# Patient Record
Sex: MALE | Race: WHITE | NOT HISPANIC OR LATINO | URBAN - METROPOLITAN AREA
[De-identification: names, ages, dates, MRNs, and addresses within clinical notes are randomized per-mention and may not be internally consistent; named-entity substitution may affect disease eponyms.]

---

## 2017-05-04 ENCOUNTER — EMERGENCY (EMERGENCY)
Facility: HOSPITAL | Age: 82
LOS: 0 days | Discharge: HOME | End: 2017-05-04

## 2017-06-28 DIAGNOSIS — Z23 ENCOUNTER FOR IMMUNIZATION: ICD-10-CM

## 2017-06-28 DIAGNOSIS — Z88.0 ALLERGY STATUS TO PENICILLIN: ICD-10-CM
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
## 2017-06-28 DIAGNOSIS — S09.90XA UNSPECIFIED INJURY OF HEAD, INITIAL ENCOUNTER: ICD-10-CM
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
## 2017-06-28 DIAGNOSIS — Z79.82 LONG TERM (CURRENT) USE OF ASPIRIN: ICD-10-CM

## 2017-06-28 DIAGNOSIS — I10 ESSENTIAL (PRIMARY) HYPERTENSION: ICD-10-CM

## 2017-06-28 DIAGNOSIS — S80.212A ABRASION, LEFT KNEE, INITIAL ENCOUNTER: ICD-10-CM

## 2018-02-23 ENCOUNTER — INPATIENT (INPATIENT)
Facility: HOSPITAL | Age: 83
LOS: 5 days | Discharge: NURSING, REHAB OR LONG-TERM CARE | End: 2018-03-01
Attending: INTERNAL MEDICINE

## 2018-02-23 VITALS
HEART RATE: 75 BPM | TEMPERATURE: 101 F | SYSTOLIC BLOOD PRESSURE: 172 MMHG | OXYGEN SATURATION: 97 % | DIASTOLIC BLOOD PRESSURE: 79 MMHG | RESPIRATION RATE: 14 BRPM

## 2018-02-23 DIAGNOSIS — G93.41 METABOLIC ENCEPHALOPATHY: ICD-10-CM

## 2018-02-23 DIAGNOSIS — G93.6 CEREBRAL EDEMA: ICD-10-CM

## 2018-02-23 LAB
ALBUMIN SERPL ELPH-MCNC: 4.2 G/DL — SIGNIFICANT CHANGE UP (ref 3–5.5)
ALP SERPL-CCNC: 64 U/L — SIGNIFICANT CHANGE UP (ref 30–115)
ALT FLD-CCNC: 15 U/L — SIGNIFICANT CHANGE UP (ref 0–41)
ANION GAP SERPL CALC-SCNC: 7 MMOL/L — SIGNIFICANT CHANGE UP (ref 7–14)
APPEARANCE UR: CLEAR — SIGNIFICANT CHANGE UP
AST SERPL-CCNC: 19 U/L — SIGNIFICANT CHANGE UP (ref 0–41)
B-TYPE NATRIURETIC PEPTIDE BNP RESULT: 79 PG/ML — SIGNIFICANT CHANGE UP (ref 0–99)
BASE EXCESS BLDV CALC-SCNC: 3.8 MMOL/L — HIGH (ref -2–2)
BASOPHILS # BLD AUTO: 0.03 K/UL — SIGNIFICANT CHANGE UP (ref 0–0.2)
BASOPHILS NFR BLD AUTO: 0.5 % — SIGNIFICANT CHANGE UP (ref 0–1)
BILIRUB SERPL-MCNC: 0.4 MG/DL — SIGNIFICANT CHANGE UP (ref 0.2–1.2)
BILIRUB UR-MCNC: NEGATIVE — SIGNIFICANT CHANGE UP
BUN SERPL-MCNC: 15 MG/DL — SIGNIFICANT CHANGE UP (ref 10–20)
CALCIUM SERPL-MCNC: 9.2 MG/DL — SIGNIFICANT CHANGE UP (ref 8.5–10.1)
CHLORIDE SERPL-SCNC: 105 MMOL/L — SIGNIFICANT CHANGE UP (ref 98–110)
CK MB BLD-MCNC: 0 % — SIGNIFICANT CHANGE UP (ref 0–4)
CK MB CFR SERPL CALC: 1.2 NG/ML — SIGNIFICANT CHANGE UP (ref 0.6–6.3)
CK SERPL-CCNC: 293 U/L — HIGH (ref 0–225)
CO2 SERPL-SCNC: 30 MMOL/L — SIGNIFICANT CHANGE UP (ref 17–32)
COLOR SPEC: YELLOW — SIGNIFICANT CHANGE UP
CREAT SERPL-MCNC: 0.8 MG/DL — SIGNIFICANT CHANGE UP (ref 0.7–1.5)
DIFF PNL FLD: (no result)
EOSINOPHIL # BLD AUTO: 0.1 K/UL — SIGNIFICANT CHANGE UP (ref 0–0.7)
EOSINOPHIL NFR BLD AUTO: 1.5 % — SIGNIFICANT CHANGE UP (ref 0–8)
GLUCOSE SERPL-MCNC: 112 MG/DL — HIGH (ref 70–110)
GLUCOSE UR QL: NEGATIVE MG/DL — SIGNIFICANT CHANGE UP
HCO3 BLDV-SCNC: 29 MMOL/L — SIGNIFICANT CHANGE UP (ref 22–29)
HCT VFR BLD CALC: 35.9 % — LOW (ref 42–52)
HGB BLD-MCNC: 12.2 G/DL — LOW (ref 14–18)
IMM GRANULOCYTES NFR BLD AUTO: 0.5 % — HIGH (ref 0.1–0.3)
KETONES UR-MCNC: NEGATIVE — SIGNIFICANT CHANGE UP
LEUKOCYTE ESTERASE UR-ACNC: NEGATIVE — SIGNIFICANT CHANGE UP
LYMPHOCYTES # BLD AUTO: 0.8 K/UL — LOW (ref 1.2–3.4)
LYMPHOCYTES # BLD AUTO: 12.2 % — LOW (ref 20.5–51.1)
MCHC RBC-ENTMCNC: 31.2 PG — HIGH (ref 27–31)
MCHC RBC-ENTMCNC: 34 G/DL — SIGNIFICANT CHANGE UP (ref 32–37)
MCV RBC AUTO: 91.8 FL — SIGNIFICANT CHANGE UP (ref 80–94)
MONOCYTES # BLD AUTO: 1.02 K/UL — HIGH (ref 0.1–0.6)
MONOCYTES NFR BLD AUTO: 15.6 % — HIGH (ref 1.7–9.3)
NEUTROPHILS # BLD AUTO: 4.57 K/UL — SIGNIFICANT CHANGE UP (ref 1.4–6.5)
NEUTROPHILS NFR BLD AUTO: 69.7 % — SIGNIFICANT CHANGE UP (ref 42.2–75.2)
NITRITE UR-MCNC: NEGATIVE — SIGNIFICANT CHANGE UP
PCO2 BLDV: 45 MMHG — SIGNIFICANT CHANGE UP (ref 41–51)
PH BLDV: 7.41 — SIGNIFICANT CHANGE UP (ref 7.26–7.43)
PH UR: 5.5 — SIGNIFICANT CHANGE UP (ref 5–8)
PLATELET # BLD AUTO: 144 K/UL — SIGNIFICANT CHANGE UP (ref 130–400)
PO2 BLDV: 34 MMHG — SIGNIFICANT CHANGE UP (ref 20–40)
POTASSIUM SERPL-MCNC: 3.9 MMOL/L — SIGNIFICANT CHANGE UP (ref 3.5–5)
POTASSIUM SERPL-SCNC: 3.9 MMOL/L — SIGNIFICANT CHANGE UP (ref 3.5–5)
PROT SERPL-MCNC: 6.9 G/DL — SIGNIFICANT CHANGE UP (ref 6–8)
PROT UR-MCNC: NEGATIVE MG/DL — SIGNIFICANT CHANGE UP
RBC # BLD: 3.91 M/UL — LOW (ref 4.7–6.1)
RBC # FLD: 12.6 % — SIGNIFICANT CHANGE UP (ref 11.5–14.5)
SAO2 % BLDV: 66 % — SIGNIFICANT CHANGE UP
SODIUM SERPL-SCNC: 142 MMOL/L — SIGNIFICANT CHANGE UP (ref 135–146)
SP GR SPEC: 1.02 — SIGNIFICANT CHANGE UP (ref 1.01–1.03)
TROPONIN I SERPL-MCNC: <0.02 NG/ML — SIGNIFICANT CHANGE UP (ref 0–0.05)
UROBILINOGEN FLD QL: 1 MG/DL (ref 0.2–0.2)
WBC # BLD: 6.55 K/UL — SIGNIFICANT CHANGE UP (ref 4.8–10.8)
WBC # FLD AUTO: 6.55 K/UL — SIGNIFICANT CHANGE UP (ref 4.8–10.8)

## 2018-02-23 RX ORDER — ACETAMINOPHEN 500 MG
650 TABLET ORAL ONCE
Qty: 0 | Refills: 0 | Status: COMPLETED | OUTPATIENT
Start: 2018-02-23 | End: 2018-02-23

## 2018-02-23 RX ORDER — AZITHROMYCIN 500 MG/1
500 TABLET, FILM COATED ORAL ONCE
Qty: 0 | Refills: 0 | Status: COMPLETED | OUTPATIENT
Start: 2018-02-23 | End: 2018-02-23

## 2018-02-23 RX ORDER — SODIUM CHLORIDE 9 MG/ML
500 INJECTION INTRAMUSCULAR; INTRAVENOUS; SUBCUTANEOUS
Qty: 0 | Refills: 0 | Status: COMPLETED | OUTPATIENT
Start: 2018-02-23 | End: 2018-02-23

## 2018-02-23 RX ORDER — CEFTRIAXONE 500 MG/1
2 INJECTION, POWDER, FOR SOLUTION INTRAMUSCULAR; INTRAVENOUS EVERY 24 HOURS
Qty: 0 | Refills: 0 | Status: DISCONTINUED | OUTPATIENT
Start: 2018-02-23 | End: 2018-02-24

## 2018-02-23 RX ORDER — SODIUM CHLORIDE 9 MG/ML
3 INJECTION INTRAMUSCULAR; INTRAVENOUS; SUBCUTANEOUS ONCE
Qty: 0 | Refills: 0 | Status: COMPLETED | OUTPATIENT
Start: 2018-02-23 | End: 2018-02-23

## 2018-02-23 RX ADMIN — SODIUM CHLORIDE 2000 MILLILITER(S): 9 INJECTION INTRAMUSCULAR; INTRAVENOUS; SUBCUTANEOUS at 19:37

## 2018-02-23 RX ADMIN — SODIUM CHLORIDE 2000 MILLILITER(S): 9 INJECTION INTRAMUSCULAR; INTRAVENOUS; SUBCUTANEOUS at 22:42

## 2018-02-23 RX ADMIN — SODIUM CHLORIDE 2000 MILLILITER(S): 9 INJECTION INTRAMUSCULAR; INTRAVENOUS; SUBCUTANEOUS at 20:01

## 2018-02-23 RX ADMIN — SODIUM CHLORIDE 2000 MILLILITER(S): 9 INJECTION INTRAMUSCULAR; INTRAVENOUS; SUBCUTANEOUS at 19:02

## 2018-02-23 RX ADMIN — Medication 650 MILLIGRAM(S): at 19:02

## 2018-02-23 RX ADMIN — SODIUM CHLORIDE 2000 MILLILITER(S): 9 INJECTION INTRAMUSCULAR; INTRAVENOUS; SUBCUTANEOUS at 21:35

## 2018-02-23 RX ADMIN — CEFTRIAXONE 100 GRAM(S): 500 INJECTION, POWDER, FOR SOLUTION INTRAMUSCULAR; INTRAVENOUS at 22:01

## 2018-02-23 RX ADMIN — SODIUM CHLORIDE 3 MILLILITER(S): 9 INJECTION INTRAMUSCULAR; INTRAVENOUS; SUBCUTANEOUS at 19:02

## 2018-02-23 RX ADMIN — AZITHROMYCIN 255 MILLIGRAM(S): 500 TABLET, FILM COATED ORAL at 20:02

## 2018-02-23 NOTE — ED PROVIDER NOTE - PHYSICAL EXAMINATION
Well appearing NAD non toxic. NCAT EOMI conjunctiva nml. No nasal discharge. dryMM. Neck supple, non tender, full ROM. RRR no MRG +S1S2. CTA b/l. Abd s NT ND +BS. Ext WWP x4, moving all extremities, 1+ pitting edema to mid calves 2+ equal pulses throughout. CN2-12 grossly intact no sensory or motor deficits throughout, . Cooperative, appropriate, following commands

## 2018-02-23 NOTE — ED PROVIDER NOTE - OBJECTIVE STATEMENT
83yo M hx R frontal loobe dementia follows with Dr. Berumen in [place] , HTN DL prostate CA p/w AMS x2d getting progressively worse- per family, +cough x2d, otherwise, mental status change is very new- per family, no fevers, v/d/abd pain, other complaints. HPI otherwise limited 2/2 AMS 83yo M hx R frontal lobe dementia follows with Dr. Berumen in [place] , HTN DL prostate CA p/w AMS x2d getting progressively worse- per family, +cough x2d, otherwise, mental status change is very new- per family, no fevers, v/d/abd pain, other complaints. HPI otherwise limited 2/2 AMS

## 2018-02-23 NOTE — ED PROVIDER NOTE - PMH
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

## 2018-02-23 NOTE — ED ADULT TRIAGE NOTE - CHIEF COMPLAINT QUOTE
Pt has been altered for a few days, recently diagnosed with alzheimers, not acting himself even more so since last night, +urinary incontinence

## 2018-02-24 LAB
ANION GAP SERPL CALC-SCNC: 12 MMOL/L — SIGNIFICANT CHANGE UP (ref 7–14)
BUN SERPL-MCNC: 11 MG/DL — SIGNIFICANT CHANGE UP (ref 10–20)
CALCIUM SERPL-MCNC: 8.7 MG/DL — SIGNIFICANT CHANGE UP (ref 8.5–10.1)
CHLORIDE SERPL-SCNC: 103 MMOL/L — SIGNIFICANT CHANGE UP (ref 98–110)
CO2 SERPL-SCNC: 22 MMOL/L — SIGNIFICANT CHANGE UP (ref 17–32)
CREAT SERPL-MCNC: 0.8 MG/DL — SIGNIFICANT CHANGE UP (ref 0.7–1.5)
GLUCOSE SERPL-MCNC: 99 MG/DL — SIGNIFICANT CHANGE UP (ref 70–110)
HCT VFR BLD CALC: 38.3 % — LOW (ref 42–52)
HGB BLD-MCNC: 11.9 G/DL — LOW (ref 14–18)
MCHC RBC-ENTMCNC: 24.2 PG — LOW (ref 27–31)
MCHC RBC-ENTMCNC: 31.1 G/DL — LOW (ref 32–37)
MCV RBC AUTO: 77.8 FL — LOW (ref 80–94)
NRBC # BLD: 0 /100 WBCS — SIGNIFICANT CHANGE UP (ref 0–0)
PLATELET # BLD AUTO: 208 K/UL — SIGNIFICANT CHANGE UP (ref 130–400)
POTASSIUM SERPL-MCNC: 4.1 MMOL/L — SIGNIFICANT CHANGE UP (ref 3.5–5)
POTASSIUM SERPL-SCNC: 4.1 MMOL/L — SIGNIFICANT CHANGE UP (ref 3.5–5)
RBC # BLD: 4.92 M/UL — SIGNIFICANT CHANGE UP (ref 4.7–6.1)
RBC # FLD: 16.6 % — HIGH (ref 11.5–14.5)
SODIUM SERPL-SCNC: 137 MMOL/L — SIGNIFICANT CHANGE UP (ref 135–146)
WBC # BLD: 7.81 K/UL — SIGNIFICANT CHANGE UP (ref 4.8–10.8)
WBC # FLD AUTO: 7.81 K/UL — SIGNIFICANT CHANGE UP (ref 4.8–10.8)

## 2018-02-24 RX ORDER — LISINOPRIL 2.5 MG/1
40 TABLET ORAL DAILY
Qty: 0 | Refills: 0 | Status: DISCONTINUED | OUTPATIENT
Start: 2018-02-24 | End: 2018-03-01

## 2018-02-24 RX ORDER — METOPROLOL TARTRATE 50 MG
100 TABLET ORAL
Qty: 0 | Refills: 0 | Status: DISCONTINUED | OUTPATIENT
Start: 2018-02-24 | End: 2018-03-01

## 2018-02-24 RX ORDER — SIMVASTATIN 20 MG/1
40 TABLET, FILM COATED ORAL AT BEDTIME
Qty: 0 | Refills: 0 | Status: DISCONTINUED | OUTPATIENT
Start: 2018-02-24 | End: 2018-03-01

## 2018-02-24 RX ORDER — DONEPEZIL HYDROCHLORIDE 10 MG/1
10 TABLET, FILM COATED ORAL AT BEDTIME
Qty: 0 | Refills: 0 | Status: DISCONTINUED | OUTPATIENT
Start: 2018-02-24 | End: 2018-03-01

## 2018-02-24 RX ORDER — SIMVASTATIN 20 MG/1
1 TABLET, FILM COATED ORAL
Qty: 0 | Refills: 0 | COMMUNITY

## 2018-02-24 RX ORDER — OMEPRAZOLE 10 MG/1
0 CAPSULE, DELAYED RELEASE ORAL
Qty: 90 | Refills: 0 | COMMUNITY

## 2018-02-24 RX ORDER — LEVOTHYROXINE SODIUM 125 MCG
50 TABLET ORAL DAILY
Qty: 0 | Refills: 0 | Status: DISCONTINUED | OUTPATIENT
Start: 2018-02-24 | End: 2018-03-01

## 2018-02-24 RX ORDER — HYDROCHLOROTHIAZIDE 25 MG
12.5 TABLET ORAL ONCE
Qty: 0 | Refills: 0 | Status: COMPLETED | OUTPATIENT
Start: 2018-02-24 | End: 2018-02-24

## 2018-02-24 RX ORDER — METOPROLOL TARTRATE 50 MG
100 TABLET ORAL ONCE
Qty: 0 | Refills: 0 | Status: COMPLETED | OUTPATIENT
Start: 2018-02-24 | End: 2018-02-24

## 2018-02-24 RX ORDER — METOPROLOL TARTRATE 50 MG
1 TABLET ORAL
Qty: 0 | Refills: 0 | COMMUNITY

## 2018-02-24 RX ORDER — TAMSULOSIN HYDROCHLORIDE 0.4 MG/1
0.4 CAPSULE ORAL AT BEDTIME
Qty: 0 | Refills: 0 | Status: DISCONTINUED | OUTPATIENT
Start: 2018-02-24 | End: 2018-03-01

## 2018-02-24 RX ORDER — SODIUM CHLORIDE 9 MG/ML
1000 INJECTION INTRAMUSCULAR; INTRAVENOUS; SUBCUTANEOUS
Qty: 0 | Refills: 0 | Status: DISCONTINUED | OUTPATIENT
Start: 2018-02-24 | End: 2018-02-27

## 2018-02-24 RX ORDER — ASPIRIN/CALCIUM CARB/MAGNESIUM 324 MG
81 TABLET ORAL DAILY
Qty: 0 | Refills: 0 | Status: DISCONTINUED | OUTPATIENT
Start: 2018-02-24 | End: 2018-03-01

## 2018-02-24 RX ORDER — ACETAMINOPHEN 500 MG
650 TABLET ORAL EVERY 6 HOURS
Qty: 0 | Refills: 0 | Status: DISCONTINUED | OUTPATIENT
Start: 2018-02-24 | End: 2018-03-01

## 2018-02-24 RX ORDER — HYDROCHLOROTHIAZIDE 25 MG
12.5 TABLET ORAL DAILY
Qty: 0 | Refills: 0 | Status: DISCONTINUED | OUTPATIENT
Start: 2018-02-24 | End: 2018-02-26

## 2018-02-24 RX ORDER — RAMIPRIL 5 MG
1 CAPSULE ORAL
Qty: 0 | Refills: 0 | COMMUNITY

## 2018-02-24 RX ORDER — PANTOPRAZOLE SODIUM 20 MG/1
40 TABLET, DELAYED RELEASE ORAL
Qty: 0 | Refills: 0 | Status: DISCONTINUED | OUTPATIENT
Start: 2018-02-24 | End: 2018-03-01

## 2018-02-24 RX ORDER — AZITHROMYCIN 500 MG/1
500 TABLET, FILM COATED ORAL EVERY 24 HOURS
Qty: 0 | Refills: 0 | Status: DISCONTINUED | OUTPATIENT
Start: 2018-02-24 | End: 2018-02-24

## 2018-02-24 RX ORDER — LEVOTHYROXINE SODIUM 125 MCG
1 TABLET ORAL
Qty: 0 | Refills: 0 | COMMUNITY

## 2018-02-24 RX ORDER — ENOXAPARIN SODIUM 100 MG/ML
40 INJECTION SUBCUTANEOUS DAILY
Qty: 0 | Refills: 0 | Status: DISCONTINUED | OUTPATIENT
Start: 2018-02-24 | End: 2018-02-25

## 2018-02-24 RX ORDER — LISINOPRIL 2.5 MG/1
40 TABLET ORAL ONCE
Qty: 0 | Refills: 0 | Status: COMPLETED | OUTPATIENT
Start: 2018-02-24 | End: 2018-02-24

## 2018-02-24 RX ORDER — LEVOTHYROXINE SODIUM 125 MCG
0 TABLET ORAL
Qty: 90 | Refills: 0 | COMMUNITY

## 2018-02-24 RX ADMIN — Medication 650 MILLIGRAM(S): at 08:51

## 2018-02-24 RX ADMIN — Medication 75 MILLIGRAM(S): at 09:12

## 2018-02-24 RX ADMIN — Medication 81 MILLIGRAM(S): at 13:19

## 2018-02-24 RX ADMIN — Medication 100 MILLIGRAM(S): at 05:32

## 2018-02-24 RX ADMIN — SODIUM CHLORIDE 50 MILLILITER(S): 9 INJECTION INTRAMUSCULAR; INTRAVENOUS; SUBCUTANEOUS at 15:08

## 2018-02-24 RX ADMIN — Medication 75 MILLIGRAM(S): at 17:36

## 2018-02-24 RX ADMIN — DONEPEZIL HYDROCHLORIDE 10 MILLIGRAM(S): 10 TABLET, FILM COATED ORAL at 22:07

## 2018-02-24 RX ADMIN — PANTOPRAZOLE SODIUM 40 MILLIGRAM(S): 20 TABLET, DELAYED RELEASE ORAL at 08:51

## 2018-02-24 RX ADMIN — Medication 12.5 MILLIGRAM(S): at 05:32

## 2018-02-24 RX ADMIN — Medication 100 MILLIGRAM(S): at 17:36

## 2018-02-24 RX ADMIN — LISINOPRIL 40 MILLIGRAM(S): 2.5 TABLET ORAL at 05:32

## 2018-02-24 RX ADMIN — Medication 50 MICROGRAM(S): at 05:32

## 2018-02-24 RX ADMIN — ENOXAPARIN SODIUM 40 MILLIGRAM(S): 100 INJECTION SUBCUTANEOUS at 12:55

## 2018-02-24 NOTE — H&P ADULT - NSHPPHYSICALEXAM_GEN_ALL_CORE
Constitutional: non-toxic,  appears confused,  tremulous  HEENT: eomi,  wnl  Respiratory:  b/l bs, scattered crackles.   Cardiovascular:  s1, s2,  tachycardic  Gastrointestinal:  nontender, nondistended, +bowel sounds  Extremities: no edema b/l le  Neurological: nonfocal; wnl, aaox3 Constitutional: non-toxic,  appears confused,  tremulous  HEENT: eomi,  wnl  Respiratory:  b/l bs, scattered crackles.   Cardiovascular:  s1, s2,  tachycardic  Gastrointestinal:  nontender, nondistended, +bowel sounds.  R back / flank pain.   Extremities: no edema b/l le  Neurological: nonfocal; wnl, aaox2 (person / place. not time)

## 2018-02-24 NOTE — H&P ADULT - NSHPLABSRESULTS_GEN_ALL_CORE
< from: CT Head No Cont (02.23.18 @ 20:06) >    Impression:    No acute intracranial hemorrhage or mass effect.    Stable left frontal meningioma with surrounding vasogenic edema.    Stable complete opacification of left maxillary sinus with hyperdense   contents, likely inspissated mucus.    < end of copied text >

## 2018-02-24 NOTE — H&P ADULT - ATTENDING COMMENTS
82y m w Pmh below brought in from home by friend for AMS. Per friend / proxy:  pt had been acting odd lately and had been more confused than usual.    In one instance, Pt told his friend that he was confused.  confused.  In another instance,  patient was found standing in [facility] hallway in the middle of the night.  when asked what he was doing,  the patient stated he was trying to find the bathroom and subsequently urinated on himself.  The patient was alert and oriented x 2 when interviewed and gave a h/o cough for about a week and flank pain.    R.O.S - see HPI, not very forthcoming about full R.O.S    CXR  WBC 6.55  T max 102    A/P   Altered mental status   Possible worsening dementia /Alzheimer's +/- delirium related to infection   -r/o Influenza r/o bronchitis r/o UTI   -IV abx  -check Bld Cx and Ucx  -check TSH, B12    H/o HTN/Dyslipidemia/hypothyroidism  -c/w current Rx  DVT prophylaxis  Prostate cancer w/ Dorantes placement in the ER   - D/C, trial of voiding in am and urology If retaining 82y m w Pmh below brought in from home by friend for AMS. Per friend / proxy:  pt had been acting odd lately and had been more confused than usual.    In one instance, Pt told his friend that he was confused.  confused.  In another instance,  patient was found standing in [facility] hallway in the middle of the night.  when asked what he was doing,  the patient stated he was trying to find the bathroom and subsequently urinated on himself.  The patient was alert and oriented x 2 when interviewed and gave a h/o cough for about a week and flank pain.    R.O.S - see HPI, not very forthcoming about full R.O.S    CXR - unofficial report - [facility] ( improved from last )  WBC 6.55  T max 102    A/A/Ox2, soft speech  HEENT - NL  Lungs - good air entry and basilar crackles   Heart - S1S2NL  Abd- soft, NT,ND  Ext. No C/C/E    A/P   Altered mental status   Possible worsening dementia /Alzheimer's +/- delirium related to infection   -r/o Influenza r/o bronchitis r/o UTI   -hold Abx for now  -check Bld Cx and Ucx  -check TSH, B12  -if above negative patient may need LP  -Neurology eval    H/o HTN/Dyslipidemia/hypothyroidism  -c/w current Rx  DVT prophylaxis  Prostate cancer w/ Dorantes placement in the ER   - D/C, trial of voiding in am and urology If retaining

## 2018-02-24 NOTE — H&P ADULT - PMH
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

## 2018-02-24 NOTE — ED ADULT NURSE REASSESSMENT NOTE - COMFORT CARE
meal provided/pt assisted w feeding
po fluids offered/wait time explained/warm blanket provided/repositioned

## 2018-02-24 NOTE — H&P ADULT - ASSESSMENT
82y male with PMH listed pw cc of AMS x few days, with recent onset of dry cough.   Found to have R back/flank pain, hematuria, and fever 102 in ed.   Found to have ALTERED MENTAL STATUS + FEVER    .    Current VS:  T(F): 98.8 (02-24 @ 00:33), Max: 102 (02-23 @ 18:59)  HR: 89 (02-24 @ 00:33)  BP: 197/86 (02-24 @ 00:33)  RR: 16 (02-24 @ 00:33)  SpO2: 98% (02-24 @ 00:33)      PLAN :    AMS  --  ddx:  delirium,  met. encephalopathy,  poorly controlled htn,  infection,  multifactorial with combination of causes.  --  check ucx, bcx, sputum cx (if possible), flu a/b  --  check tsh  --  pt may be at baseline currently based on friend/proxy's description of baseline over phone convo.     FEVER   + COUGH  + HEMATURIA + R FLANK PAIN  --  check ct noncon (r/o stones)  --  look for casts in urine  --  consider nephro eval vs uro eval  --  r/o pna or flu  --  symptomatic management with guafeneisin  --  iv abx:  rocephin + azithro    POORLY CONTROLLED HTN  --  reinstate home bp medications (will give first doses now)  --  recheck bp after giving medications while patient at rest    DVT PPX

## 2018-02-24 NOTE — H&P ADULT - HISTORY OF PRESENT ILLNESS
82y m w pmh below brought in from home by friend for ams.   Per friend / proxy:  pt had been acting odd lately and had been more confused than usual.    In one instance, Pt told his friend that he was confused.  confused.  In another instance,  patient was found standing in [facility] hallway in the middle of the night.  when asked what he was doing,  the patient stated he was trying to find the bathroom and subsequently urinated on himself.      Per friend,  pt had recently had cold and had gotten cold medicine from doctor. Pt had dry cough without other symptoms. 82y m w pmh below brought in from home by friend for ams.   Per friend / proxy:  pt had been acting odd lately and had been more confused than usual.    In one instance, Pt told his friend that he was confused.  confused.  In another instance,  patient was found standing in [facility] hallway in the middle of the night.  when asked what he was doing,  the patient stated he was trying to find the bathroom and subsequently urinated on himself.      Per friend,  pt had recently had cold and had gotten cold medicine from doctor. Pt had dry cough without other symptoms.       Per friend,  pt at baseline is unable to finish thoughts / speak full thoughts / sentences (at least for past 1/2 year), and has tremors (due to ?parkinsons?, unknown).     Currently, pt is unable to fully explain what is bothering him.  However states he has no cp / abd pain.  Does state he's having some R back / flank pain occasionally, but does not recall when it started.  Otherwise is not c/o anything else. He knows he was confused before and that's why he was brought in.  Upon further questioning, he seems frustrated he cannot remember his medications or his medical issues or his symptoms.

## 2018-02-25 LAB
ALBUMIN SERPL ELPH-MCNC: 4 G/DL — SIGNIFICANT CHANGE UP (ref 3–5.5)
ALP SERPL-CCNC: 50 U/L — SIGNIFICANT CHANGE UP (ref 30–115)
ALT FLD-CCNC: 23 U/L — SIGNIFICANT CHANGE UP (ref 0–41)
ANION GAP SERPL CALC-SCNC: 7 MMOL/L — SIGNIFICANT CHANGE UP (ref 7–14)
APPEARANCE UR: (no result)
AST SERPL-CCNC: 69 U/L — HIGH (ref 0–41)
BASOPHILS # BLD AUTO: 0.04 K/UL — SIGNIFICANT CHANGE UP (ref 0–0.2)
BASOPHILS NFR BLD AUTO: 0.8 % — SIGNIFICANT CHANGE UP (ref 0–1)
BILIRUB SERPL-MCNC: 0.8 MG/DL — SIGNIFICANT CHANGE UP (ref 0.2–1.2)
BILIRUB UR-MCNC: NEGATIVE — SIGNIFICANT CHANGE UP
BUN SERPL-MCNC: 13 MG/DL — SIGNIFICANT CHANGE UP (ref 10–20)
CALCIUM SERPL-MCNC: 9.2 MG/DL — SIGNIFICANT CHANGE UP (ref 8.5–10.1)
CHLORIDE SERPL-SCNC: 100 MMOL/L — SIGNIFICANT CHANGE UP (ref 98–110)
CO2 SERPL-SCNC: 30 MMOL/L — SIGNIFICANT CHANGE UP (ref 17–32)
COLOR SPEC: SIGNIFICANT CHANGE UP
CREAT SERPL-MCNC: 0.7 MG/DL — SIGNIFICANT CHANGE UP (ref 0.7–1.5)
CULTURE RESULTS: NO GROWTH — SIGNIFICANT CHANGE UP
DIFF PNL FLD: (no result)
EOSINOPHIL # BLD AUTO: 0.06 K/UL — SIGNIFICANT CHANGE UP (ref 0–0.7)
EOSINOPHIL NFR BLD AUTO: 1.2 % — SIGNIFICANT CHANGE UP (ref 0–8)
GLUCOSE SERPL-MCNC: 84 MG/DL — SIGNIFICANT CHANGE UP (ref 70–110)
GLUCOSE UR QL: NEGATIVE MG/DL — SIGNIFICANT CHANGE UP
HCT VFR BLD CALC: 36.3 % — LOW (ref 42–52)
HCT VFR BLD CALC: 36.4 % — LOW (ref 42–52)
HGB BLD-MCNC: 12.5 G/DL — LOW (ref 14–18)
HGB BLD-MCNC: 12.7 G/DL — LOW (ref 14–18)
IMM GRANULOCYTES NFR BLD AUTO: 0.4 % — HIGH (ref 0.1–0.3)
INR BLD: 1.2 RATIO — SIGNIFICANT CHANGE UP (ref 0.65–1.3)
KETONES UR-MCNC: 15
LACTATE SERPL-SCNC: 1.1 MMOL/L — SIGNIFICANT CHANGE UP (ref 0.5–2.2)
LEUKOCYTE ESTERASE UR-ACNC: (no result)
LYMPHOCYTES # BLD AUTO: 1.18 K/UL — LOW (ref 1.2–3.4)
LYMPHOCYTES # BLD AUTO: 23.4 % — SIGNIFICANT CHANGE UP (ref 20.5–51.1)
MAGNESIUM SERPL-MCNC: 2.1 MG/DL — SIGNIFICANT CHANGE UP (ref 1.8–2.4)
MCHC RBC-ENTMCNC: 31.3 PG — HIGH (ref 27–31)
MCHC RBC-ENTMCNC: 31.5 PG — HIGH (ref 27–31)
MCHC RBC-ENTMCNC: 34.4 G/DL — SIGNIFICANT CHANGE UP (ref 32–37)
MCHC RBC-ENTMCNC: 34.9 G/DL — SIGNIFICANT CHANGE UP (ref 32–37)
MCV RBC AUTO: 90.3 FL — SIGNIFICANT CHANGE UP (ref 80–94)
MCV RBC AUTO: 91 FL — SIGNIFICANT CHANGE UP (ref 80–94)
MONOCYTES # BLD AUTO: 0.82 K/UL — HIGH (ref 0.1–0.6)
MONOCYTES NFR BLD AUTO: 16.3 % — HIGH (ref 1.7–9.3)
NEUTROPHILS # BLD AUTO: 2.92 K/UL — SIGNIFICANT CHANGE UP (ref 1.4–6.5)
NEUTROPHILS NFR BLD AUTO: 57.9 % — SIGNIFICANT CHANGE UP (ref 42.2–75.2)
NITRITE UR-MCNC: NEGATIVE — SIGNIFICANT CHANGE UP
NRBC # BLD: 0 /100 WBCS — SIGNIFICANT CHANGE UP (ref 0–0)
PH UR: 6.5 — SIGNIFICANT CHANGE UP (ref 5–8)
PLATELET # BLD AUTO: 137 K/UL — SIGNIFICANT CHANGE UP (ref 130–400)
PLATELET # BLD AUTO: 137 K/UL — SIGNIFICANT CHANGE UP (ref 130–400)
POTASSIUM SERPL-MCNC: 3.1 MMOL/L — LOW (ref 3.5–5)
POTASSIUM SERPL-SCNC: 3.1 MMOL/L — LOW (ref 3.5–5)
PROT SERPL-MCNC: 7.1 G/DL — SIGNIFICANT CHANGE UP (ref 6–8)
PROT UR-MCNC: (no result) MG/DL
PROTHROM AB SERPL-ACNC: 13 SEC — HIGH (ref 9.95–12.87)
RBC # BLD: 3.99 M/UL — LOW (ref 4.7–6.1)
RBC # BLD: 4.03 M/UL — LOW (ref 4.7–6.1)
RBC # FLD: 12.4 % — SIGNIFICANT CHANGE UP (ref 11.5–14.5)
RBC # FLD: 12.5 % — SIGNIFICANT CHANGE UP (ref 11.5–14.5)
SODIUM SERPL-SCNC: 137 MMOL/L — SIGNIFICANT CHANGE UP (ref 135–146)
SP GR SPEC: 1.01 — SIGNIFICANT CHANGE UP (ref 1.01–1.03)
SPECIMEN SOURCE: SIGNIFICANT CHANGE UP
TYPE + AB SCN PNL BLD: SIGNIFICANT CHANGE UP
UROBILINOGEN FLD QL: 1 MG/DL (ref 0.2–0.2)
WBC # BLD: 5.04 K/UL — SIGNIFICANT CHANGE UP (ref 4.8–10.8)
WBC # BLD: 5.92 K/UL — SIGNIFICANT CHANGE UP (ref 4.8–10.8)
WBC # FLD AUTO: 5.04 K/UL — SIGNIFICANT CHANGE UP (ref 4.8–10.8)
WBC # FLD AUTO: 5.92 K/UL — SIGNIFICANT CHANGE UP (ref 4.8–10.8)

## 2018-02-25 RX ADMIN — Medication 12.5 MILLIGRAM(S): at 06:36

## 2018-02-25 RX ADMIN — SODIUM CHLORIDE 50 MILLILITER(S): 9 INJECTION INTRAMUSCULAR; INTRAVENOUS; SUBCUTANEOUS at 21:16

## 2018-02-25 RX ADMIN — LISINOPRIL 40 MILLIGRAM(S): 2.5 TABLET ORAL at 06:36

## 2018-02-25 RX ADMIN — TAMSULOSIN HYDROCHLORIDE 0.4 MILLIGRAM(S): 0.4 CAPSULE ORAL at 00:06

## 2018-02-25 RX ADMIN — Medication 50 MICROGRAM(S): at 06:36

## 2018-02-25 RX ADMIN — Medication 81 MILLIGRAM(S): at 12:23

## 2018-02-25 RX ADMIN — SIMVASTATIN 40 MILLIGRAM(S): 20 TABLET, FILM COATED ORAL at 00:06

## 2018-02-25 RX ADMIN — Medication 75 MILLIGRAM(S): at 06:36

## 2018-02-25 RX ADMIN — Medication 75 MILLIGRAM(S): at 14:37

## 2018-02-25 RX ADMIN — Medication 100 MILLIGRAM(S): at 06:36

## 2018-02-25 RX ADMIN — Medication 100 MILLIGRAM(S): at 14:39

## 2018-02-25 RX ADMIN — Medication 650 MILLIGRAM(S): at 00:07

## 2018-02-25 RX ADMIN — SODIUM CHLORIDE 50 MILLILITER(S): 9 INJECTION INTRAMUSCULAR; INTRAVENOUS; SUBCUTANEOUS at 00:06

## 2018-02-25 NOTE — CONSULT NOTE ADULT - SUBJECTIVE AND OBJECTIVE BOX
Patient is a 82 year old Male who presented to ER w AMS, found to be +influenza. At some point, pt was c/o R flank pain and hematuria was noted so non-con CT was done which was essentially unremarkable.     PMHx: Meningioma, High cholesterol, Hypertension, Alzheimer's disease, Prostate Ca s/p seed implantation, Hypothyroidism  PSHx: No significant past surgical history  MEDS: acetaminophen   Tablet 650 milliGRAM(s) PRN, aspirin enteric coated 81 milliGRAM(s), donepezil 10 milliGRAM(s), hydrochlorothiazide 12.5 milliGRAM(s), levothyroxine 50   MICROGram(s), lisinopril 40 milliGRAM(s), metoprolol tartrate 100 milliGRAM(s), oseltamivir 75 milliGRAM(s), pantoprazole Tablet 40 milliGRAM(s), promethazine Syrup 6.25 milliGRAM(s) PRN, simvastatin 40 milliGRAM(s), sodium chloride 0.9%. 1000 milliLiter(s), tamsulosin 0.4 milliGRAM(s  ALLERGIES: penicillin (Hives)    VS: T(F): 97.4, Max: 100.3 ( @ 00:24), HR: 57 (57 - 75), BP: 198/89 (111/75 - 198/89), RR: 18, SpO2: 98% (97% - 98%)  GEN: Alert, awake, NAD    LABS/IMAGIN.5   5.04  )-----------( 137      ( 2018 09:15 )             36.3     137  |  100  |  13  ----------------------------<  84  3.1<L>   |  30  |  0.7    Ca    9.2      2018 09:15  Mg     2.1         TPro  7.1  /  Alb  4.0  /  TBili  0.8  /  DBili  x   /  AST  69<H>  /  ALT  23  /  AlkPhos  50  -25    PT/INR - ( 2018 09:15 )   PT: 13.00 sec;   INR: 1.20 ratio       Urinalysis Basic - ( 2018 10:30 )  Color: Dark Yellow / Appearance: Cloudy / S.015 / pH: x  Gluc: x / Ketone: 15  / Bili: Negative / Urobili: 1.0 mg/dL   Blood: x / Protein: Trace mg/dL / Nitrite: Negative   Leuk Esterase: Trace / RBC: x / WBC x   Sq Epi: x / Non Sq Epi: x / Bacteria: x    CT A/P non-con: Bilateral renal cysts and subcentimeter hypodensities, too small to characterize. No renal calculus. No hydronephrosis. No bladder calculus. The bladder is collapsed. There are prostate radiation seeds. Patient is a 82 year old Male w hx prostate Ca s/p XRT/seed implants 2 years ago presented to ER w AMS, found to have fever, +influenza, hematuria & R flank pain. Non-con CT essentially unremarkable. Patient had Dorantes placed in ER which was removed this AM. Voiding per meatus without suprapubic pressure/discomfort. +hematuria x2 days with dysuria & incontinence. No urgency/frequency, abdominal or flank pain. Has never had hematuria before.    PMHx: Frontal lobe dementia, meningioma, High cholesterol, Hypertension, Alzheimer's disease, Prostate Ca s/p seed implantation, Hypothyroidism  PSHx: No significant past surgical history  MEDS: acetaminophen   Tablet 650 milliGRAM(s) PRN, aspirin enteric coated 81 milliGRAM(s), donepezil 10 milliGRAM(s), hydrochlorothiazide 12.5 milliGRAM(s), levothyroxine 50   MICROGram(s), lisinopril 40 milliGRAM(s), metoprolol tartrate 100 milliGRAM(s), oseltamivir 75 milliGRAM(s), pantoprazole Tablet 40 milliGRAM(s), promethazine Syrup 6.25 milliGRAM(s) PRN, simvastatin 40 milliGRAM(s), sodium chloride 0.9%. 1000 milliLiter(s), tamsulosin 0.4 milliGRAM(s  ALLERGIES: penicillin (Hives)    VS: T(F): 97.4, Max: 100.3 (- @ 00:24), HR: 57 (57 - 75), BP: 198/89 (111/75 - 198/89), RR: 18, SpO2: 98% (97% - 98%)  GEN: Alert, awake, NAD  ABD/: soft, NT/ND, nonpalpable bladder, no suprapubic TTP, uncirc Male, testes descended x2 without masses or TTP, +bloody urine leakage noted at meatus    LABS/IMAGIN.5   5.04  )-----------( 137      ( 2018 09:15 )             36.3     137  |  100  |  13  ----------------------------<  84  3.1<L>   |  30  |  0.7    Ca    9.2      2018 09:15  Mg     2.1     02-25    TPro  7.1  /  Alb  4.0  /  TBili  0.8  /  DBili  x   /  AST  69<H>  /  ALT  23  /  AlkPhos  50  -    PT/INR - ( 2018 09:15 )   PT: 13.00 sec;   INR: 1.20 ratio       Urinalysis Basic - ( 2018 10:30 )  Color: Dark Yellow / Appearance: Cloudy / S.015 / pH: x  Gluc: x / Ketone: 15  / Bili: Negative / Urobili: 1.0 mg/dL   Blood: x / Protein: Trace mg/dL / Nitrite: Negative   Leuk Esterase: Trace / RBC: x / WBC x   Sq Epi: x / Non Sq Epi: x / Bacteria: x    CT A/P non-con: Bilateral renal cysts and subcentimeter hypodensities, too small to characterize. No renal calculus. No hydronephrosis. No bladder calculus. The bladder is collapsed. There are prostate radiation seeds. Patient is a 82 year old Male w hx prostate Ca s/p XRT/seed implants 2 years ago presented to ER w AMS, found to have fever, +influenza, hematuria & R flank pain. Non-con CT essentially unremarkable. Patient had Dorantes placed in ER which was removed this AM. Voiding per meatus without suprapubic pressure/discomfort. +hematuria x2 days with dysuria & incontinence. No urgency/frequency, abdominal or flank pain. Has never had hematuria before.    PMHx: Frontal lobe dementia, meningioma, High cholesterol, Hypertension, Alzheimer's disease, Prostate Ca s/p seed implantation, Hypothyroidism  PSHx: No significant past surgical history  MEDS: acetaminophen   Tablet 650 milliGRAM(s) PRN, aspirin enteric coated 81 milliGRAM(s), donepezil 10 milliGRAM(s), hydrochlorothiazide 12.5 milliGRAM(s), levothyroxine 50 MICROGram(s), lisinopril 40 milliGRAM(s), metoprolol tartrate 100 milliGRAM(s), oseltamivir 75 milliGRAM(s), pantoprazole Tablet 40 milliGRAM(s), promethazine Syrup 6.25 milliGRAM(s) PRN, simvastatin 40 milliGRAM(s), sodium chloride 0.9%. 1000 milliLiter(s), tamsulosin 0.4 milliGRAM(s  ALLERGIES: penicillin (Hives)    VS: T(F): 97.4, Max: 100.3 (- @ 00:24), HR: 57 (57 - 75), BP: 198/89 (111/75 - 198/89), RR: 18, SpO2: 98% (97% - 98%)  GEN: Alert, awake, NAD  ABD/: soft, NT/ND, nonpalpable bladder, no suprapubic TTP, uncirc Male, testes descended x2 without masses or TTP, +bloody urine leakage noted at meatus    LABS/IMAGIN.5   5.04  )-----------( 137      ( 2018 09:15 )             36.3     137  |  100  |  13  ----------------------------<  84  3.1<L>   |  30  |  0.7    Ca    9.2      2018 09:15  Mg     2.1         TPro  7.1  /  Alb  4.0  /  TBili  0.8  /  DBili  x   /  AST  69<H>  /  ALT  23  /  AlkPhos  50      PT/INR - ( 2018 09:15 )   PT: 13.00 sec;   INR: 1.20 ratio       Urinalysis Basic - ( 2018 10:30 )  Color: Dark Yellow / Appearance: Cloudy / S.015 / pH: x  Gluc: x / Ketone: 15  / Bili: Negative / Urobili: 1.0 mg/dL   Blood: x / Protein: Trace mg/dL / Nitrite: Negative   Leuk Esterase: Trace / RBC: x / WBC x   Sq Epi: x / Non Sq Epi: x / Bacteria: x    CT A/P non-con: Bilateral renal cysts and subcentimeter hypodensities, too small to characterize. No renal calculus. No hydronephrosis. No bladder calculus. The bladder is collapsed. There are prostate radiation seeds.    US Urinary Bladder : Patient voided prior to the examination and the bladder is empty.

## 2018-02-25 NOTE — CONSULT NOTE ADULT - ASSESSMENT
82 year old Male with:  ·	Hematuria  · 82 year old Male, hx prostate Ca s/p XRT/seed implants with:  ·	Gross hematuria -- may be 2* recent Dorantes insertion/removal or rad. cystitis  ·	Incontinence    PLAN:  1.	Urine cytology  2.	F/u urine cx  3.	IVF hydration  4.	F/u bladder sono (done)  5.	Will follow, likely needs cystoscopy as outpt 82 year old Male, hx prostate Ca s/p XRT/seed implants with:  ·	Gross hematuria -- may be 2* recent Dorantes insertion/removal or rad. cystitis  ·	Incontinence    PLAN:  1.	Urine cytology  2.	F/u urine cx  3.	IVF hydration  4.	Will follow  5.	Cystoscopy as outpt

## 2018-02-25 NOTE — PROGRESS NOTE ADULT - SUBJECTIVE AND OBJECTIVE BOX
ENRIQUE HORNE  82y  Male      Patient is a 82y old  Male who presents with a chief complaint of AMS (2018 01:49)      INTERVAL HPI/OVERNIGHT EVENTS:  He c/o blood in urine which started yesterday, there is burning and dysuria, also he reports urine leakage which has been for a weeks. He feels better, he denies any pain.     Vital Signs Last 24 Hrs  T(C): 36.3 (2018 08:24), Max: 37.9 (2018 00:24)  T(F): 97.4 (2018 08:24), Max: 100.3 (2018 00:24)  HR: 57 (2018 08:24) (57 - 75)  BP: 198/89 (2018 08:24) (111/75 - 198/89)  BP(mean): --  RR: 18 (2018 08:24) (18 - 20)  SpO2: 98% (2018 06:36) (97% - 98%)      18 @ 07:01  -  18 @ 07:00  --------------------------------------------------------  IN: 0 mL / OUT: 1000 mL / NET: -1000 mL            Consultant(s) Notes Reviewed:  [x ] YES  [ ] NO          MEDICATIONS  (STANDING):  aspirin enteric coated 81 milliGRAM(s) Oral daily  donepezil 10 milliGRAM(s) Oral at bedtime  hydrochlorothiazide 12.5 milliGRAM(s) Oral daily  levothyroxine 50 MICROGram(s) Oral daily  lisinopril 40 milliGRAM(s) Oral daily  metoprolol     tartrate 100 milliGRAM(s) Oral two times a day  oseltamivir 75 milliGRAM(s) Oral two times a day  pantoprazole    Tablet 40 milliGRAM(s) Oral before breakfast  simvastatin 40 milliGRAM(s) Oral at bedtime  sodium chloride 0.9%. 1000 milliLiter(s) (50 mL/Hr) IV Continuous <Continuous>  tamsulosin 0.4 milliGRAM(s) Oral at bedtime    MEDICATIONS  (PRN):  acetaminophen   Tablet 650 milliGRAM(s) Oral every 6 hours PRN For Temp greater than 38.5 C (101.3 F)  promethazine Syrup 6.25 milliGRAM(s) Oral three times a day PRN nausea      LABS                          12.5   5.04  )-----------( 137      ( 2018 09:15 )             36.3         137  |  100  |  13  ----------------------------<  84  3.1<L>   |  30  |  0.7    Ca    9.2      2018 09:15  Mg     2.1         TPro  7.1  /  Alb  4.0  /  TBili  0.8  /  DBili  x   /  AST  69<H>  /  ALT  23  /  AlkPhos  50        Urinalysis Basic - ( 2018 10:30 )    Color: Dark Yellow / Appearance: Cloudy / S.015 / pH: x  Gluc: x / Ketone: 15  / Bili: Negative / Urobili: 1.0 mg/dL   Blood: x / Protein: Trace mg/dL / Nitrite: Negative   Leuk Esterase: Trace / RBC: x / WBC x   Sq Epi: x / Non Sq Epi: x / Bacteria: x      PT/INR - ( 2018 09:15 )   PT: 13.00 sec;   INR: 1.20 ratio           Lactate Trend   @ 09:15 Lactate:1.1     CARDIAC MARKERS ( 2018 19:15 )  <0.02 ng/mL / x     / 293 U/L / x     / 1.2 ng/mL      CAPILLARY BLOOD GLUCOSE          Culture - Blood (collected 18 @ 22:19)  Source: .Blood Blood-Peripheral  Preliminary Report (18 @ 06:00):    No growth to date.    Culture - Blood (collected 18 @ 18:15)  Source: .Blood Blood-Peripheral  Preliminary Report (18 @ 01:01):    No growth to date.        RADIOLOGY & ADDITIONAL TESTS:    Imaging Personally Reviewed:  [ ] YES  [ ] NO    HEALTH ISSUES - PROBLEM Dx:

## 2018-02-26 LAB
ANION GAP SERPL CALC-SCNC: 9 MMOL/L — SIGNIFICANT CHANGE UP (ref 7–14)
BUN SERPL-MCNC: 10 MG/DL — SIGNIFICANT CHANGE UP (ref 10–20)
CALCIUM SERPL-MCNC: 8.7 MG/DL — SIGNIFICANT CHANGE UP (ref 8.5–10.1)
CHLORIDE SERPL-SCNC: 102 MMOL/L — SIGNIFICANT CHANGE UP (ref 98–110)
CO2 SERPL-SCNC: 28 MMOL/L — SIGNIFICANT CHANGE UP (ref 17–32)
CREAT SERPL-MCNC: 0.7 MG/DL — SIGNIFICANT CHANGE UP (ref 0.7–1.5)
GLUCOSE SERPL-MCNC: 107 MG/DL — SIGNIFICANT CHANGE UP (ref 70–110)
PCP SPEC-MCNC: SIGNIFICANT CHANGE UP
POTASSIUM SERPL-MCNC: 3.2 MMOL/L — LOW (ref 3.5–5)
POTASSIUM SERPL-SCNC: 3.2 MMOL/L — LOW (ref 3.5–5)
SODIUM SERPL-SCNC: 139 MMOL/L — SIGNIFICANT CHANGE UP (ref 135–146)
T4 AB SER-ACNC: 8.8 UG/DL — SIGNIFICANT CHANGE UP (ref 4.6–12)
TSH SERPL-MCNC: 2.98 UIU/ML — SIGNIFICANT CHANGE UP (ref 0.27–4.2)

## 2018-02-26 RX ORDER — HYDROCHLOROTHIAZIDE 25 MG
25 TABLET ORAL DAILY
Qty: 0 | Refills: 0 | Status: DISCONTINUED | OUTPATIENT
Start: 2018-02-26 | End: 2018-03-01

## 2018-02-26 RX ORDER — POTASSIUM CHLORIDE 20 MEQ
40 PACKET (EA) ORAL EVERY 4 HOURS
Qty: 0 | Refills: 0 | Status: COMPLETED | OUTPATIENT
Start: 2018-02-26 | End: 2018-02-26

## 2018-02-26 RX ORDER — HYDRALAZINE HCL 50 MG
50 TABLET ORAL ONCE
Qty: 0 | Refills: 0 | Status: COMPLETED | OUTPATIENT
Start: 2018-02-26 | End: 2018-02-26

## 2018-02-26 RX ADMIN — Medication 50 MILLIGRAM(S): at 02:06

## 2018-02-26 RX ADMIN — Medication 40 MILLIEQUIVALENT(S): at 22:15

## 2018-02-26 RX ADMIN — DONEPEZIL HYDROCHLORIDE 10 MILLIGRAM(S): 10 TABLET, FILM COATED ORAL at 22:16

## 2018-02-26 RX ADMIN — Medication 12.5 MILLIGRAM(S): at 06:25

## 2018-02-26 RX ADMIN — Medication 75 MILLIGRAM(S): at 17:35

## 2018-02-26 RX ADMIN — DONEPEZIL HYDROCHLORIDE 10 MILLIGRAM(S): 10 TABLET, FILM COATED ORAL at 01:16

## 2018-02-26 RX ADMIN — LISINOPRIL 40 MILLIGRAM(S): 2.5 TABLET ORAL at 06:25

## 2018-02-26 RX ADMIN — TAMSULOSIN HYDROCHLORIDE 0.4 MILLIGRAM(S): 0.4 CAPSULE ORAL at 02:05

## 2018-02-26 RX ADMIN — TAMSULOSIN HYDROCHLORIDE 0.4 MILLIGRAM(S): 0.4 CAPSULE ORAL at 22:16

## 2018-02-26 RX ADMIN — SODIUM CHLORIDE 50 MILLILITER(S): 9 INJECTION INTRAMUSCULAR; INTRAVENOUS; SUBCUTANEOUS at 06:24

## 2018-02-26 RX ADMIN — Medication 50 MICROGRAM(S): at 06:23

## 2018-02-26 RX ADMIN — PANTOPRAZOLE SODIUM 40 MILLIGRAM(S): 20 TABLET, DELAYED RELEASE ORAL at 08:15

## 2018-02-26 RX ADMIN — SIMVASTATIN 40 MILLIGRAM(S): 20 TABLET, FILM COATED ORAL at 22:16

## 2018-02-26 RX ADMIN — SIMVASTATIN 40 MILLIGRAM(S): 20 TABLET, FILM COATED ORAL at 01:17

## 2018-02-26 RX ADMIN — Medication 75 MILLIGRAM(S): at 06:45

## 2018-02-26 RX ADMIN — Medication 100 MILLIGRAM(S): at 17:38

## 2018-02-26 RX ADMIN — Medication 25 MILLIGRAM(S): at 17:38

## 2018-02-26 RX ADMIN — Medication 81 MILLIGRAM(S): at 12:28

## 2018-02-26 RX ADMIN — Medication 100 MILLIGRAM(S): at 06:26

## 2018-02-26 NOTE — CONSULT NOTE ADULT - ASSESSMENT

## 2018-02-26 NOTE — PROGRESS NOTE ADULT - ASSESSMENT
1. AMS: resolved, likely from Influenza.   Patient has hx of Meningioma.   Head CT showed No acute intracranial hemorrhage or mass effect. Stable left frontal meningioma with surrounding vasogenic edema. Stable complete opacification of left maxillary sinus with hyperdense    2. Influenza A;   continue with droplet isolation  Continue Tamiflu 75mg BID started 2/24     3. Hematuria:   with LUTS: dysuria, incontinence and urgency.   UA showed blood, negative for infection.  Possible traumatic, Dorantes placement in ED??   Abdomen and Pelvis CT noted, no significant abnormalities.   Hx of prostate Ca. Consult urology, order bladder US.   Continue Flomax.     4. HTN:  uncontrolled  On Lisinopril 40mg, metoprolol 100mg BID, HCTZ 12.5mg  May increase HCTZ to 25mg daily if BP still elevated tomorrow.     5. Alzheimer's dementia: stable  patient is oriented to place not to time. baseline.     6. Hypothyroidism:   continue Synthroid.     7. Hyperlipidemia:   on Simvastatin.     DVT PPX:  Venodyne boots, no heparin or LMWH due to hematuria 1. AMS: resolved, likely from Influenza.   Patient has hx of Meningioma.   Head CT showed No acute intracranial hemorrhage or mass effect. Stable left frontal meningioma with surrounding vasogenic edema. Stable complete opacification of left maxillary sinus with hyperdense    2. Influenza A;   continue with droplet isolation  Continue Tamiflu 75mg BID started 2/24     3. Hematuria:   UA showed blood, negative for infection.  Possible traumatic due to , Dorantes placement in ED   Abdomen and Pelvis CT noted, no significant abnormalities.   Hx of prostate Ca. urology recommend out patient cystoscopy and urine cytology  Continue Flomax.     4. HTN:  uncontrolled  On Lisinopril 40mg, metoprolol 100mg BID, HCTZ 12.5mg    5. Alzheimer's dementia: stable  patient is oriented times 3    6. Hypothyroidism:   continue Synthroid.     7. Hyperlipidemia:   on Simvastatin.     DVT PPX:  Venodyne boots, no heparin or LMWH due to hematuria    8- pt rehab- snf vs home care  for snf, patient might have to finish course of tamiflu here   will follow with   9- had hypokalemioa yesterday- will check bmp 4 pm 1. AMS: resolved, likely from Influenza.   Patient has hx of Meningioma.   Head CT showed No acute intracranial hemorrhage or mass effect. Stable left frontal meningioma with surrounding vasogenic edema. Stable complete opacification of left maxillary sinus with hyperdense    2. Influenza A;   continue with droplet isolation  Continue Tamiflu 75mg BID started 2/24     3. Hematuria:   UA showed blood, negative for infection.  Possible traumatic due to , Dorantes placement in ED   Abdomen and Pelvis CT noted, no significant abnormalities.   Hx of prostate Ca. urology recommend out patient cystoscopy and urine cytology  Continue Flomax.     4. HTN:  uncontrolled  On Lisinopril 40mg, metoprolol 100mg BID, HCTZ 12.5mg    5. Alzheimer's dementia: stable  patient is oriented times 3    6. Hypothyroidism:   continue Synthroid.     7. Hyperlipidemia:   on Simvastatin.     DVT PPX:  Venodyne boots, no heparin or LMWH due to hematuria    8- pt rehab- snf vs home care, was able to walk with PT  for snf, patient might have to finish course of tamiflu here   patient wants to discuss with family and decide abt snf vs home care, however, as per pt, his family is not available to talk today and make the decision  and therefore will anticipate for tomorrow  9- had hypokalemioa yesterday- will check bmp 4 pm

## 2018-02-26 NOTE — PROGRESS NOTE ADULT - ASSESSMENT
1. AMS: resolved, likely from Influenza.   Patient has hx of Meningioma.   Head CT showed No acute intracranial hemorrhage or mass effect. Stable left frontal meningioma with surrounding vasogenic edema. Stable complete opacification of left maxillary sinus with hyperdense    2. Influenza A;   continue with droplet isolation  Continue Tamiflu 75mg BID    3. Hematuria: improved.   Likely traumatic from Dorantes placement.   Abdomen and Pelvis CT noted, no significant abnormalities.   Hx of prostate Ca.  urology consult appreciated   Continue Flomax.     4. HTN:  uncontrolled  On Lisinopril 40mg, metoprolol 100mg BID.   Increase HCTZ to 25mg     5. Alzheimer's dementia: stable  patient is oriented to place not to time. baseline.     6. Hypothyroidism:   continue Synthroid.     7. Hyperlipidemia:   on Simvastatin.     DVT PPX:  Lovenox SC.     Disposition  Pending PT eval, Home with care vs STR.

## 2018-02-26 NOTE — PROGRESS NOTE ADULT - SUBJECTIVE AND OBJECTIVE BOX
ENRIQUE HORNE  82y  Male      Patient is a 82y old  Male who presents with a chief complaint of AMS (2018 01:49)      INTERVAL HPI/OVERNIGHT EVENTS:  Patient feels ok, he has no acute complaints.     Vital Signs Last 24 Hrs  T(C): 36.9 (2018 07:55), Max: 37.2 (2018 23:31)  T(F): 98.4 (2018 07:55), Max: 98.9 (2018 23:31)  HR: 75 (2018 07:55) (62 - 75)  BP: 165/84 (2018 07:55) (165/76 - 202/92)  BP(mean): --  RR: 18 (2018 07:55) (18 - 18)  SpO2: 97% (2018 07:55) (97% - 99%)      18 @ 07:01  -  18 @ 15:10  --------------------------------------------------------  IN: 150 mL / OUT: 0 mL / NET: 150 mL            Consultant(s) Notes Reviewed:  [x ] YES  [ ] NO          MEDICATIONS  (STANDING):  aspirin enteric coated 81 milliGRAM(s) Oral daily  donepezil 10 milliGRAM(s) Oral at bedtime  hydrochlorothiazide 25 milliGRAM(s) Oral daily  levothyroxine 50 MICROGram(s) Oral daily  lisinopril 40 milliGRAM(s) Oral daily  metoprolol     tartrate 100 milliGRAM(s) Oral two times a day  oseltamivir 75 milliGRAM(s) Oral two times a day  pantoprazole    Tablet 40 milliGRAM(s) Oral before breakfast  simvastatin 40 milliGRAM(s) Oral at bedtime  sodium chloride 0.9%. 1000 milliLiter(s) (50 mL/Hr) IV Continuous <Continuous>  tamsulosin 0.4 milliGRAM(s) Oral at bedtime    MEDICATIONS  (PRN):  acetaminophen   Tablet 650 milliGRAM(s) Oral every 6 hours PRN For Temp greater than 38.5 C (101.3 F)  promethazine Syrup 6.25 milliGRAM(s) Oral three times a day PRN nausea      LABS                          12.7   5.92  )-----------( 137      ( 2018 17:18 )             36.4         137  |  100  |  13  ----------------------------<  84  3.1<L>   |  30  |  0.7    Ca    9.2      2018 09:15  Mg     2.1         TPro  7.1  /  Alb  4.0  /  TBili  0.8  /  DBili  x   /  AST  69<H>  /  ALT  23  /  AlkPhos  50        Urinalysis Basic - ( 2018 10:30 )    Color: Dark Yellow / Appearance: Cloudy / S.015 / pH: x  Gluc: x / Ketone: 15  / Bili: Negative / Urobili: 1.0 mg/dL   Blood: x / Protein: Trace mg/dL / Nitrite: Negative   Leuk Esterase: Trace / RBC: >50 /HPF / WBC 1-2 /HPF   Sq Epi: x / Non Sq Epi: x / Bacteria: x      PT/INR - ( 2018 09:15 )   PT: 13.00 sec;   INR: 1.20 ratio           Lactate Trend   @ 09:15 Lactate:1.1         CAPILLARY BLOOD GLUCOSE          Culture - Blood (collected 18 @ 22:19)  Source: .Blood Blood-Peripheral  Preliminary Report (18 @ 06:00):    No growth to date.    Culture - Urine (collected 18 @ 20:08)  Source: .Urine Catheterized  Final Report (18 @ 22:32):    No growth    Culture - Blood (collected 18 @ 18:15)  Source: .Blood Blood-Peripheral  Preliminary Report (18 @ 01:01):    No growth to date.        RADIOLOGY & ADDITIONAL TESTS:    Imaging Personally Reviewed:  [ ] YES  [ ] NO    HEALTH ISSUES - PROBLEM Dx:          Physical Exam:   · Constitutional	detailed exam	  · Constitutional Details	well-nourished; no distress	  · Eyes	detailed exam	  · Eyes Details	PERRL; EOMI	  · Neck	detailed exam	  · Neck Details	normal; supple; no JVD	  · Respiratory	detailed exam	  · Respiratory Details	breath sounds equal; respirations non-labored; clear to auscultation bilaterally	  · Cardiovascular	detailed exam	  · Cardiovascular Details	regular rate and rhythm  no rub  no murmur	  · Gastrointestinal	detailed exam	  · GI Normal	soft; nontender; no distention	  · Extremities	detailed exam	  · Extremities Details	no clubbing; no cyanosis; no pedal edema

## 2018-02-26 NOTE — CONSULT NOTE ADULT - SUBJECTIVE AND OBJECTIVE BOX
HPI:  82y m w pmh below brought in from home by friend for ams.   Per friend / proxy:  pt had been acting odd lately and had been more confused than usual.    In one instance, Pt told his friend that he was confused.  confused.  In another instance,  patient was found standing in [facility] hallway in the middle of the night.  when asked what he was doing,  the patient stated he was trying to find the bathroom and subsequently urinated on himself.      Per friend,  pt had recently had cold and had gotten cold medicine from doctor. Pt had dry cough without other symptoms.       Per friend,  pt at baseline is unable to finish thoughts / speak full thoughts / sentences (at least for past 1/2 year), and has tremors (due to ?parkinsons?, unknown).     Currently, pt is unable to fully explain what is bothering him.  However states he has no cp / abd pain.  Does state he's having some R back / flank pain occasionally, but does not recall when it started.  Otherwise is not c/o anything else. He knows he was confused before and that's why he was brought in.  Upon further questioning, he seems frustrated he cannot remember his medications or his medical issues or his symptoms. (2018 01:49). Patient has influenza A and on tamiflu.      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  Prostate cancer  Hypothyroidism, unspecified type  No significant past surgical history      Hospital Course:    TODAY'S SUBJECTIVE & REVIEW OF SYMPTOMS:     Constitutional WNL   Cardio WNL   Resp WNL   GI WNL  Heme WNL  Endo WNL  Skin WNL  MSK WNL  Neuro WNL  Cognitive confused  Psych WNL      MEDICATIONS  (STANDING):  aspirin enteric coated 81 milliGRAM(s) Oral daily  donepezil 10 milliGRAM(s) Oral at bedtime  hydrochlorothiazide 12.5 milliGRAM(s) Oral daily  levothyroxine 50 MICROGram(s) Oral daily  lisinopril 40 milliGRAM(s) Oral daily  metoprolol     tartrate 100 milliGRAM(s) Oral two times a day  oseltamivir 75 milliGRAM(s) Oral two times a day  pantoprazole    Tablet 40 milliGRAM(s) Oral before breakfast  simvastatin 40 milliGRAM(s) Oral at bedtime  sodium chloride 0.9%. 1000 milliLiter(s) (50 mL/Hr) IV Continuous <Continuous>  tamsulosin 0.4 milliGRAM(s) Oral at bedtime    MEDICATIONS  (PRN):  acetaminophen   Tablet 650 milliGRAM(s) Oral every 6 hours PRN For Temp greater than 38.5 C (101.3 F)  promethazine Syrup 6.25 milliGRAM(s) Oral three times a day PRN nausea      FAMILY HISTORY:nc      Allergies    penicillin (Hives)    Intolerances        SOCIAL HISTORY:    [  ] Etoh  [  ] Smoking  [  ] Substance abuse     Home Environment:  [  ] Home Alone  [x  ] Lives with Family  [  ] Home Health Aid    Dwelling:  [  ] Apartment  [ x ] Private House  [  ] Adult Home  [  ] Skilled Nursing Facility      [  ] Short Term  [  ] Long Term  [x  ] Stairs       Elevator [  ]    FUNCTIONAL STATUS PTA: (Check all that apply)  Ambulation: [  x ]Independent    [  ] Dependent     [  ] Non-Ambulatory  Assistive Device: [  ] SA Cane  [  ]  Q Cane  [  ] Walker  [  ]  Wheelchair  ADL : [ x ] Independent  [  ]  Dependent       Vital Signs Last 24 Hrs  T(C): 36.9 (2018 07:55), Max: 37.2 (2018 23:31)  T(F): 98.4 (2018 07:55), Max: 98.9 (2018 23:31)  HR: 75 (2018 07:55) (62 - 75)  BP: 165/84 (2018 07:55) (165/76 - 202/92)  BP(mean): --  RR: 18 (2018 07:55) (18 - 18)  SpO2: 97% (2018 07:55) (97% - 99%)      PHYSICAL EXAM: Awake / confused  GENERAL: NAD, well-groomed, well-developed  HEAD:  Atraumatic, Normocephalic  EYES: EOMI, PERRLA, conjunctiva and sclera clear  NECK: Supple, No JVD, Normal thyroid  CHEST/LUNG: Clear to percussion bilaterally; No rales, rhonchi, wheezing, or rubs  HEART: Regular rate and rhythm; No murmurs, rubs, or gallops  ABDOMEN: Soft, Nontender, Nondistended; Bowel sounds present  EXTREMITIES:  2+ Peripheral Pulses, No clubbing, cyanosis, or edema    NERVOUS SYSTEM:  Cranial Nerves 2-12 intact [  ] Abnormal  [  ]  ROM: WFL all extremities [ x ]  Abnormal [  ]  Motor Strength: WFL all extremities  [x  ]  Abnormal [  ]  Sensation: intact to light touch [x  ] Abnormal [  ]  Reflexes: Symmetric [  ]  Abnormal [  ]    FUNCTIONAL STATUS:  Bed Mobility: Independent [  ]  Supervision [  ]  Needs Assistance [ x ]  N/A [  ]  Transfers: Independent [  ]  Supervision [  ]  Needs Assistance [x  ]  N/A [  ]   Ambulation: Independent [  ]  Supervision [  ]  Needs Assistance [  ]  N/A [  ]  ADL: Independent [  ] Requires Assistance [  ] N/A [  ]      LABS:                        12.7   5.92  )-----------( 137      ( 2018 17:18 )             36.4         137  |  100  |  13  ----------------------------<  84  3.1<L>   |  30  |  0.7    Ca    9.2      2018 09:15  Mg     2.1         TPro  7.1  /  Alb  4.0  /  TBili  0.8  /  DBili  x   /  AST  69<H>  /  ALT  23  /  AlkPhos  50      PT/INR - ( 2018 09:15 )   PT: 13.00 sec;   INR: 1.20 ratio           Urinalysis Basic - ( 2018 10:30 )    Color: Dark Yellow / Appearance: Cloudy / S.015 / pH: x  Gluc: x / Ketone: 15  / Bili: Negative / Urobili: 1.0 mg/dL   Blood: x / Protein: Trace mg/dL / Nitrite: Negative   Leuk Esterase: Trace / RBC: >50 /HPF / WBC 1-2 /HPF   Sq Epi: x / Non Sq Epi: x / Bacteria: x        RADIOLOGY & ADDITIONAL STUDIES:    Assesment:

## 2018-02-26 NOTE — PROGRESS NOTE ADULT - SUBJECTIVE AND OBJECTIVE BOX
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  Prostate cancer  Hypothyroidism, unspecified type  No significant past surgical history      Vital Signs Last 24 Hrs  T(C): 36.9 (26 Feb 2018 07:55), Max: 37.2 (25 Feb 2018 23:31)  T(F): 98.4 (26 Feb 2018 07:55), Max: 98.9 (25 Feb 2018 23:31)  HR: 75 (26 Feb 2018 07:55) (62 - 75)  BP: 165/84 (26 Feb 2018 07:55) (165/76 - 202/92)  BP(mean): --  RR: 18 (26 Feb 2018 07:55) (18 - 18)  SpO2: 97% (26 Feb 2018 07:55) (97% - 99%)    PHYSICAL EXAM:      Constitutional: well developed and well nourished    Respiratory: lungs B/L clear on auscultation    Cardiovascular: S1S2 normal, no murmur heard    Gastrointestinal: Abd soft, non distended, non tender, BS+    Extremities: No pedal edema    Neurological: AAOX3, No FND          I&O's Summary                            12.7   5.92  )-----------( 137      ( 25 Feb 2018 17:18 )             36.4     02-25    137  |  100  |  13  ----------------------------<  84  3.1<L>   |  30  |  0.7    Ca    9.2      25 Feb 2018 09:15  Mg     2.1     02-25    TPro  7.1  /  Alb  4.0  /  TBili  0.8  /  DBili  x   /  AST  69<H>  /  ALT  23  /  AlkPhos  50  02-25        CAPILLARY BLOOD GLUCOSE        PT/INR - ( 25 Feb 2018 09:15 )   PT: 13.00 sec;   INR: 1.20 ratio             Culture - Blood (collected 23 Feb 2018 22:19)  Source: .Blood Blood-Peripheral  Preliminary Report (25 Feb 2018 06:00):    No growth to date.    Culture - Urine (collected 23 Feb 2018 20:08)  Source: .Urine Catheterized  Final Report (25 Feb 2018 22:32):    No growth    Culture - Blood (collected 23 Feb 2018 18:15)  Source: .Blood Blood-Peripheral  Preliminary Report (25 Feb 2018 01:01):    No growth to date.        MEDICATIONS  (STANDING):  aspirin enteric coated 81 milliGRAM(s) Oral daily  donepezil 10 milliGRAM(s) Oral at bedtime  hydrochlorothiazide 12.5 milliGRAM(s) Oral daily  levothyroxine 50 MICROGram(s) Oral daily  lisinopril 40 milliGRAM(s) Oral daily  metoprolol     tartrate 100 milliGRAM(s) Oral two times a day  oseltamivir 75 milliGRAM(s) Oral two times a day  pantoprazole    Tablet 40 milliGRAM(s) Oral before breakfast  simvastatin 40 milliGRAM(s) Oral at bedtime  sodium chloride 0.9%. 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  Prostate cancer  Hypothyroidism, unspecified type  No significant past surgical history      Vital Signs Last 24 Hrs  T(C): 36.9 (26 Feb 2018 07:55), Max: 37.2 (25 Feb 2018 23:31)  T(F): 98.4 (26 Feb 2018 07:55), Max: 98.9 (25 Feb 2018 23:31)  HR: 75 (26 Feb 2018 07:55) (62 - 75)  BP: 165/84 (26 Feb 2018 07:55) (165/76 - 202/92)  BP(mean): --  RR: 18 (26 Feb 2018 07:55) (18 - 18)  SpO2: 97% (26 Feb 2018 07:55) (97% - 99%)    PHYSICAL EXAM:      Constitutional: well developed and well nourished    Respiratory: lungs B/L clear on auscultation    Cardiovascular: S1S2 normal, no murmur heard    Gastrointestinal: Abd soft, non distended, non tender, BS+    Extremities: No pedal edema    Neurological: AAOX3, No FND          I&O's Summary                            12.7   5.92  )-----------( 137      ( 25 Feb 2018 17:18 )             36.4     02-25    137  |  100  |  13  ----------------------------<  84  3.1<L>   |  30  |  0.7    Ca    9.2      25 Feb 2018 09:15  Mg     2.1     02-25    TPro  7.1  /  Alb  4.0  /  TBili  0.8  /  DBili  x   /  AST  69<H>  /  ALT  23  /  AlkPhos  50  02-25        CAPILLARY BLOOD GLUCOSE        PT/INR - ( 25 Feb 2018 09:15 )   PT: 13.00 sec;   INR: 1.20 ratio             Culture - Blood (collected 23 Feb 2018 22:19)  Source: .Blood Blood-Peripheral  Preliminary Report (25 Feb 2018 06:00):    No growth to date.    Culture - Urine (collected 23 Feb 2018 20:08)  Source: .Urine Catheterized  Final Report (25 Feb 2018 22:32):    No growth    Culture - Blood (collected 23 Feb 2018 18:15)  Source: .Blood Blood-Peripheral  Preliminary Report (25 Feb 2018 01:01):    No growth to date.        MEDICATIONS  (STANDING):  aspirin enteric coated 81 milliGRAM(s) Oral daily  donepezil 10 milliGRAM(s) Oral at bedtime  hydrochlorothiazide 12.5 milliGRAM(s) Oral daily  levothyroxine 50 MICROGram(s) Oral daily  lisinopril 40 milliGRAM(s) Oral daily  metoprolol     tartrate 100 milliGRAM(s) Oral two times a day  oseltamivir 75 milliGRAM(s) Oral two times a day  pantoprazole    Tablet 40 milliGRAM(s) Oral before breakfast  simvastatin 40 milliGRAM(s) Oral at bedtime  sodium chloride 0.9%. 1000 milliLiter(s) (50 mL/Hr) IV Continuous <Continuous>  tamsulosin 0.4 milliGRAM(s) Oral at bedtime    MEDICATIONS  (PRN):  acetaminophen   Tablet 650 milliGRAM(s) Oral every 6 hours PRN For Temp greater than 38.5 C (101.3 F)  promethazine Syrup 6.25 milliGRAM(s) Oral three times a day PRN nausea

## 2018-02-27 LAB
ANION GAP SERPL CALC-SCNC: 9 MMOL/L — SIGNIFICANT CHANGE UP (ref 7–14)
BUN SERPL-MCNC: 9 MG/DL — LOW (ref 10–20)
CALCIUM SERPL-MCNC: 9.3 MG/DL — SIGNIFICANT CHANGE UP (ref 8.5–10.1)
CHLORIDE SERPL-SCNC: 102 MMOL/L — SIGNIFICANT CHANGE UP (ref 98–110)
CO2 SERPL-SCNC: 27 MMOL/L — SIGNIFICANT CHANGE UP (ref 17–32)
CREAT SERPL-MCNC: 0.7 MG/DL — SIGNIFICANT CHANGE UP (ref 0.7–1.5)
CULTURE RESULTS: NO GROWTH — SIGNIFICANT CHANGE UP
GLUCOSE SERPL-MCNC: 98 MG/DL — SIGNIFICANT CHANGE UP (ref 70–110)
POTASSIUM SERPL-MCNC: 3.6 MMOL/L — SIGNIFICANT CHANGE UP (ref 3.5–5)
POTASSIUM SERPL-SCNC: 3.6 MMOL/L — SIGNIFICANT CHANGE UP (ref 3.5–5)
SODIUM SERPL-SCNC: 138 MMOL/L — SIGNIFICANT CHANGE UP (ref 135–146)
SPECIMEN SOURCE: SIGNIFICANT CHANGE UP

## 2018-02-27 RX ORDER — ENOXAPARIN SODIUM 100 MG/ML
40 INJECTION SUBCUTANEOUS DAILY
Qty: 0 | Refills: 0 | Status: DISCONTINUED | OUTPATIENT
Start: 2018-02-27 | End: 2018-03-01

## 2018-02-27 RX ORDER — AMLODIPINE BESYLATE 2.5 MG/1
5 TABLET ORAL DAILY
Qty: 0 | Refills: 0 | Status: DISCONTINUED | OUTPATIENT
Start: 2018-02-27 | End: 2018-02-28

## 2018-02-27 RX ADMIN — LISINOPRIL 40 MILLIGRAM(S): 2.5 TABLET ORAL at 05:10

## 2018-02-27 RX ADMIN — Medication 75 MILLIGRAM(S): at 16:39

## 2018-02-27 RX ADMIN — Medication 100 MILLIGRAM(S): at 05:11

## 2018-02-27 RX ADMIN — TAMSULOSIN HYDROCHLORIDE 0.4 MILLIGRAM(S): 0.4 CAPSULE ORAL at 23:24

## 2018-02-27 RX ADMIN — DONEPEZIL HYDROCHLORIDE 10 MILLIGRAM(S): 10 TABLET, FILM COATED ORAL at 21:52

## 2018-02-27 RX ADMIN — SIMVASTATIN 40 MILLIGRAM(S): 20 TABLET, FILM COATED ORAL at 21:52

## 2018-02-27 RX ADMIN — Medication 40 MILLIEQUIVALENT(S): at 05:08

## 2018-02-27 RX ADMIN — Medication 50 MICROGRAM(S): at 05:10

## 2018-02-27 RX ADMIN — PANTOPRAZOLE SODIUM 40 MILLIGRAM(S): 20 TABLET, DELAYED RELEASE ORAL at 10:40

## 2018-02-27 RX ADMIN — AMLODIPINE BESYLATE 5 MILLIGRAM(S): 2.5 TABLET ORAL at 21:08

## 2018-02-27 RX ADMIN — Medication 75 MILLIGRAM(S): at 05:21

## 2018-02-27 RX ADMIN — Medication 100 MILLIGRAM(S): at 16:37

## 2018-02-27 RX ADMIN — Medication 25 MILLIGRAM(S): at 05:10

## 2018-02-27 RX ADMIN — Medication 81 MILLIGRAM(S): at 12:28

## 2018-02-27 RX ADMIN — ENOXAPARIN SODIUM 40 MILLIGRAM(S): 100 INJECTION SUBCUTANEOUS at 16:20
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
## 2018-02-27 NOTE — PROGRESS NOTE ADULT - SUBJECTIVE AND OBJECTIVE BOX
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  Prostate cancer  Hypothyroidism, unspecified type  No significant past surgical history      Vital Signs Last 24 Hrs  T(C): 36.9 (27 Feb 2018 07:42), Max: 37 (26 Feb 2018 20:42)  T(F): 98.5 (27 Feb 2018 07:42), Max: 98.6 (26 Feb 2018 20:42)  HR: 75 (27 Feb 2018 07:42) (63 - 82)  BP: 194/90 (27 Feb 2018 07:42) (143/72 - 195/88)  BP(mean): --  RR: 20 (27 Feb 2018 07:42) (17 - 20)  SpO2: 97% (27 Feb 2018 07:42) (96% - 98%)    PHYSICAL EXAM:      Constitutional: well developed and well nourished    Respiratory: lungs B/L clear on auscultation    Cardiovascular: S1S2 normal, no murmur heard    Gastrointestinal: Abd soft, non distended, non tender, BS+    Extremities: No pedal edema    Neurological: AAOX3, No FND          I&O's Summary    26 Feb 2018 07:01  -  27 Feb 2018 07:00  --------------------------------------------------------  IN: 600 mL / OUT: 0 mL / NET: 600 mL                              12.7   5.92  )-----------( 137      ( 25 Feb 2018 17:18 )             36.4     02-27    138  |  102  |  9<L>  ----------------------------<  98  3.6   |  27  |  0.7    Ca    9.3      27 Feb 2018 10:39    Culture - Urine (collected 26 Feb 2018 00:00)  Source: .Urine Clean Catch (Midstream)  Final Report (27 Feb 2018 11:45):    No growth    Culture - Blood (collected 25 Feb 2018 09:15)  Source: .Blood None  Preliminary Report (27 Feb 2018 01:02):    No growth to date.        MEDICATIONS  (STANDING):  aspirin enteric coated 81 milliGRAM(s) Oral daily  donepezil 10 milliGRAM(s) Oral at bedtime  hydrochlorothiazide 25 milliGRAM(s) Oral daily  levothyroxine 50 MICROGram(s) Oral daily  lisinopril 40 milliGRAM(s) Oral daily  metoprolol     tartrate 100 milliGRAM(s) Oral two times a day  oseltamivir 75 milliGRAM(s) Oral two times a day  pantoprazole    Tablet 40 milliGRAM(s) Oral before breakfast  simvastatin 40 milliGRAM(s) Oral at bedtime  sodium chloride 0.9%. 1000 milliLiter(s) (50 mL/Hr) IV Continuous <Continuous>  tamsulosin 0.4 milliGRAM(s) Oral at bedtime    MEDICATIONS  (PRN):  acetaminophen   Tablet 650 milliGRAM(s) Oral every 6 hours PRN For Temp greater than 38.5 C (101.3 F)  promethazine Syrup 6.25 milliGRAM(s) Oral three times a day PRN nausea Patient is a 82y old  Male who was brought in  with a chief complaint of AMS (24 Feb 2018 01:49)  AMS resolved not  now he is awake and alert and oriented x3.   no pain, feels better.     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  Prostate cancer  Hypothyroidism, unspecified type  No significant past surgical history      Vital Signs Last 24 Hrs  T(C): 36.9 (27 Feb 2018 07:42), Max: 37 (26 Feb 2018 20:42)  T(F): 98.5 (27 Feb 2018 07:42), Max: 98.6 (26 Feb 2018 20:42)  HR: 75 (27 Feb 2018 07:42) (63 - 82)  BP: 194/90 (27 Feb 2018 07:42) (143/72 - 195/88)  BP(mean): --  RR: 20 (27 Feb 2018 07:42) (17 - 20)  SpO2: 97% (27 Feb 2018 07:42) (96% - 98%)    PHYSICAL EXAM:      Constitutional: well developed and well nourished    Respiratory: lungs B/L clear on auscultation    Cardiovascular: S1S2 normal, no murmur heard    Gastrointestinal: Abd soft, non distended, non tender, BS+    Extremities: No pedal edema    Neurological: AAOX3, No FND          I&O's Summary    26 Feb 2018 07:01  -  27 Feb 2018 07:00  --------------------------------------------------------  IN: 600 mL / OUT: 0 mL / NET: 600 mL                              12.7   5.92  )-----------( 137      ( 25 Feb 2018 17:18 )             36.4     02-27    138  |  102  |  9<L>  ----------------------------<  98  3.6   |  27  |  0.7    Ca    9.3      27 Feb 2018 10:39    Culture - Urine (collected 26 Feb 2018 00:00)  Source: .Urine Clean Catch (Midstream)  Final Report (27 Feb 2018 11:45):    No growth    Culture - Blood (collected 25 Feb 2018 09:15)  Source: .Blood None  Preliminary Report (27 Feb 2018 01:02):    No growth to date.        MEDICATIONS  (STANDING):  aspirin enteric coated 81 milliGRAM(s) Oral daily  donepezil 10 milliGRAM(s) Oral at bedtime  hydrochlorothiazide 25 milliGRAM(s) Oral daily  levothyroxine 50 MICROGram(s) Oral daily  lisinopril 40 milliGRAM(s) Oral daily  metoprolol     tartrate 100 milliGRAM(s) Oral two times a day  oseltamivir 75 milliGRAM(s) Oral two times a day  pantoprazole    Tablet 40 milliGRAM(s) Oral before breakfast  simvastatin 40 milliGRAM(s) Oral at bedtime  sodium chloride 0.9%. 1000 milliLiter(s) (50 mL/Hr) IV Continuous <Continuous>  tamsulosin 0.4 milliGRAM(s) Oral at bedtime    MEDICATIONS  (PRN):  acetaminophen   Tablet 650 milliGRAM(s) Oral every 6 hours PRN For Temp greater than 38.5 C (101.3 F)  promethazine Syrup 6.25 milliGRAM(s) Oral three times a day PRN nausea

## 2018-02-28 ENCOUNTER — TRANSCRIPTION ENCOUNTER (OUTPATIENT)
Age: 83
End: 2018-02-28

## 2018-02-28 DIAGNOSIS — C61 MALIGNANT NEOPLASM OF PROSTATE: ICD-10-CM

## 2018-02-28 DIAGNOSIS — J11.1 INFLUENZA DUE TO UNIDENTIFIED INFLUENZA VIRUS WITH OTHER RESPIRATORY MANIFESTATIONS: ICD-10-CM

## 2018-02-28 DIAGNOSIS — R41.0 DISORIENTATION, UNSPECIFIED: ICD-10-CM

## 2018-02-28 DIAGNOSIS — E03.9 HYPOTHYROIDISM, UNSPECIFIED: ICD-10-CM

## 2018-02-28 DIAGNOSIS — I10 ESSENTIAL (PRIMARY) HYPERTENSION: ICD-10-CM

## 2018-02-28 DIAGNOSIS — E78.00 PURE HYPERCHOLESTEROLEMIA, UNSPECIFIED: ICD-10-CM

## 2018-02-28 DIAGNOSIS — D32.9 BENIGN NEOPLASM OF MENINGES, UNSPECIFIED: ICD-10-CM

## 2018-02-28 RX ORDER — ASPIRIN/CALCIUM CARB/MAGNESIUM 324 MG
1 TABLET ORAL
Qty: 0 | Refills: 0 | COMMUNITY
Start: 2018-02-28

## 2018-02-28 RX ORDER — SIMVASTATIN 20 MG/1
0 TABLET, FILM COATED ORAL
Qty: 90 | Refills: 0 | COMMUNITY

## 2018-02-28 RX ORDER — TAMSULOSIN HYDROCHLORIDE 0.4 MG/1
0 CAPSULE ORAL
Qty: 90 | Refills: 0 | COMMUNITY

## 2018-02-28 RX ORDER — METOPROLOL TARTRATE 50 MG
0 TABLET ORAL
Qty: 180 | Refills: 0 | COMMUNITY

## 2018-02-28 RX ORDER — TAMSULOSIN HYDROCHLORIDE 0.4 MG/1
1 CAPSULE ORAL
Qty: 0 | Refills: 0 | COMMUNITY
Start: 2018-02-28

## 2018-02-28 RX ORDER — DONEPEZIL HYDROCHLORIDE 10 MG/1
0 TABLET, FILM COATED ORAL
Qty: 90 | Refills: 0 | COMMUNITY

## 2018-02-28 RX ORDER — LISINOPRIL 2.5 MG/1
1 TABLET ORAL
Qty: 0 | Refills: 0 | COMMUNITY
Start: 2018-02-28

## 2018-02-28 RX ORDER — METOPROLOL TARTRATE 50 MG
1 TABLET ORAL
Qty: 0 | Refills: 0 | COMMUNITY
Start: 2018-02-28

## 2018-02-28 RX ORDER — AMLODIPINE BESYLATE 2.5 MG/1
1 TABLET ORAL
Qty: 0 | Refills: 0 | COMMUNITY
Start: 2018-02-28

## 2018-02-28 RX ORDER — LEVOTHYROXINE SODIUM 125 MCG
50 TABLET ORAL
Qty: 0 | Refills: 0 | COMMUNITY

## 2018-02-28 RX ORDER — RAMIPRIL 5 MG
0 CAPSULE ORAL
Qty: 90 | Refills: 0 | COMMUNITY

## 2018-02-28 RX ORDER — ASPIRIN/CALCIUM CARB/MAGNESIUM 324 MG
1 TABLET ORAL
Qty: 0 | Refills: 0 | COMMUNITY

## 2018-02-28 RX ORDER — DONEPEZIL HYDROCHLORIDE 10 MG/1
1 TABLET, FILM COATED ORAL
Qty: 0 | Refills: 0 | COMMUNITY
Start: 2018-02-28

## 2018-02-28 RX ORDER — SIMVASTATIN 20 MG/1
1 TABLET, FILM COATED ORAL
Qty: 0 | Refills: 0 | COMMUNITY
Start: 2018-02-28

## 2018-02-28 RX ORDER — ACETAMINOPHEN 500 MG
2 TABLET ORAL
Qty: 0 | Refills: 0 | COMMUNITY
Start: 2018-02-28

## 2018-02-28 RX ORDER — LEVOTHYROXINE SODIUM 125 MCG
1 TABLET ORAL
Qty: 0 | Refills: 0 | COMMUNITY
Start: 2018-02-28

## 2018-02-28 RX ADMIN — Medication 75 MILLIGRAM(S): at 17:35

## 2018-02-28 RX ADMIN — TAMSULOSIN HYDROCHLORIDE 0.4 MILLIGRAM(S): 0.4 CAPSULE ORAL at 22:00

## 2018-02-28 RX ADMIN — Medication 75 MILLIGRAM(S): at 05:59

## 2018-02-28 RX ADMIN — AMLODIPINE BESYLATE 5 MILLIGRAM(S): 2.5 TABLET ORAL at 06:08

## 2018-02-28 RX ADMIN — DONEPEZIL HYDROCHLORIDE 10 MILLIGRAM(S): 10 TABLET, FILM COATED ORAL at 22:01

## 2018-02-28 RX ADMIN — LISINOPRIL 40 MILLIGRAM(S): 2.5 TABLET ORAL at 06:00

## 2018-02-28 RX ADMIN — SIMVASTATIN 40 MILLIGRAM(S): 20 TABLET, FILM COATED ORAL at 22:00

## 2018-02-28 RX ADMIN — Medication 81 MILLIGRAM(S): at 12:24

## 2018-02-28 RX ADMIN — Medication 100 MILLIGRAM(S): at 07:40

## 2018-02-28 RX ADMIN — Medication 25 MILLIGRAM(S): at 06:00

## 2018-02-28 RX ADMIN — Medication 50 MICROGRAM(S): at 06:01

## 2018-02-28 RX ADMIN — ENOXAPARIN SODIUM 40 MILLIGRAM(S): 100 INJECTION SUBCUTANEOUS at 12:24

## 2018-02-28 RX ADMIN — Medication 100 MILLIGRAM(S): at 17:35

## 2018-02-28 RX ADMIN — PANTOPRAZOLE SODIUM 40 MILLIGRAM(S): 20 TABLET, DELAYED RELEASE ORAL at 06:03

## 2018-02-28 NOTE — DISCHARGE NOTE ADULT - MEDICATION SUMMARY - MEDICATIONS TO TAKE
I will START or STAY ON the medications listed below when I get home from the hospital:    acetaminophen 325 mg oral tablet  -- 2 tab(s) by mouth every 6 hours, As needed, For Temp greater than 38.5 C (101.3 F)  -- Indication: For Fever    aspirin 81 mg oral delayed release tablet  -- 1 tab(s) by mouth once a day  -- Indication: For HEALTH MAINTENANCE    lisinopril 40 mg oral tablet  -- 1 tab(s) by mouth once a day  -- Indication: For Hypertension, unspecified type    tamsulosin 0.4 mg oral capsule  -- 1 cap(s) by mouth once a day (at bedtime)  -- Indication: For Prostate cancer    promethazine 6.25 mg/5 mL oral syrup  -- 5 milliliter(s) by mouth 3 times a day, As needed, nausea  -- Indication: For VOMITING    simvastatin 40 mg oral tablet  -- 1 tab(s) by mouth once a day (at bedtime)  -- Indication: For High cholesterol    oseltamivir 75 mg oral capsule  -- 1 cap(s) by mouth 2 times a day  -- Indication: For Flu    metoprolol tartrate 100 mg oral tablet  -- 1 tab(s) by mouth 2 times a day  -- Indication: For bp    amLODIPine 5 mg oral tablet  -- 1 tab(s) by mouth once a day  -- Indication: For bp    donepezil 10 mg oral tablet  -- 1 tab(s) by mouth once a day (at bedtime)  -- Indication: For 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 I will START or STAY ON the medications listed below when I get home from the hospital:    acetaminophen 325 mg oral tablet  -- 2 tab(s) by mouth every 6 hours, As needed, For Temp greater than 38.5 C (101.3 F)  -- Indication: For Fever    aspirin 81 mg oral delayed release tablet  -- 1 tab(s) by mouth once a day  -- Indication: For HEALTH MAINTENANCE    ramipril 10 mg oral capsule  -- 1 cap(s) by mouth once a day  -- Indication: For Hypertension, unspecified type    tamsulosin 0.4 mg oral capsule  -- 1 cap(s) by mouth once a day (at bedtime)  -- Indication: For Prostate cancer    promethazine 6.25 mg/5 mL oral syrup  -- 5 milliliter(s) by mouth 3 times a day, As needed, nausea  -- Indication: For VOMITING    simvastatin 40 mg oral tablet  -- 1 tab(s) by mouth once a day (at bedtime)  -- Indication: For High cholesterol    metoprolol tartrate 100 mg oral tablet  -- 1 tab(s) by mouth 2 times a day  -- Indication: For bp    donepezil 10 mg oral tablet  -- 1 tab(s) by mouth once a day (at bedtime)  -- Indication: For 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

## 2018-02-28 NOTE — DISCHARGE NOTE ADULT - CARE PROVIDER_API CALL
Billie Edward (DO), Internal Medicine  [facility] [place] [address]  Phone: [phone]  Fax: [phone]

## 2018-02-28 NOTE — PROGRESS NOTE ADULT - SUBJECTIVE AND OBJECTIVE BOX
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  Prostate cancer  Hypothyroidism, unspecified type  No significant past surgical history      Vital Signs Last 24 Hrs  T(C): 36.5 (28 Feb 2018 15:25), Max: 36.5 (28 Feb 2018 15:25)  T(F): 97.7 (28 Feb 2018 15:25), Max: 97.7 (28 Feb 2018 15:25)  HR: 72 (28 Feb 2018 15:25) (61 - 74)  BP: 125/59 (28 Feb 2018 15:25) (125/59 - 191/79)  BP(mean): --  RR: 19 (28 Feb 2018 15:25) (18 - 19)  SpO2: 98% (28 Feb 2018 15:25) (97% - 98%)    PHYSICAL EXAM:      Constitutional: well developed and well nourished    Respiratory: lungs B/L clear on auscultation    Cardiovascular: S1S2 normal, no murmur heard    Gastrointestinal: Abd soft, non distended, non tender, BS+    Extremities: No pedal edema    Neurological: AAOX3, No FND      02-27    138  |  102  |  9<L>  ----------------------------<  98  3.6   |  27  |  0.7    Ca    9.3      27 Feb 2018 10:39      Culture - Urine (collected 26 Feb 2018 00:00)  Source: .Urine Clean Catch (Midstream)  Final Report (27 Feb 2018 11:45):    No growth      MEDICATIONS  (STANDING):  amLODIPine   Tablet 5 milliGRAM(s) Oral daily  aspirin enteric coated 81 milliGRAM(s) Oral daily  donepezil 10 milliGRAM(s) Oral at bedtime  enoxaparin Injectable 40 milliGRAM(s) SubCutaneous daily  hydrochlorothiazide 25 milliGRAM(s) Oral daily  levothyroxine 50 MICROGram(s) Oral daily  lisinopril 40 milliGRAM(s) Oral daily  metoprolol     tartrate 100 milliGRAM(s) Oral two times a day  oseltamivir 75 milliGRAM(s) Oral two times a day  pantoprazole    Tablet 40 milliGRAM(s) Oral before breakfast  simvastatin 40 milliGRAM(s) Oral at bedtime  tamsulosin 0.4 milliGRAM(s) Oral at bedtime    MEDICATIONS  (PRN):  acetaminophen   Tablet 650 milliGRAM(s) Oral every 6 hours PRN For Temp greater than 38.5 C (101.3 F)  promethazine Syrup 6.25 milliGRAM(s) Oral three times a day PRN nausea

## 2018-02-28 NOTE — PROGRESS NOTE ADULT - SUBJECTIVE AND OBJECTIVE BOX
Patient is a 82y old  Male who presents with a chief complaint of AMS (28 Feb 2018 11:23)    HPI:  82y m w pmh below brought in from home by friend for ams.   Per friend / proxy:  pt had been acting odd lately and had been more confused than usual.    In one instance, Pt told his friend that he was confused.  confused.  In another instance,  patient was found standing in [facility] hallway in the middle of the night.  when asked what he was doing,  the patient stated he was trying to find the bathroom and subsequently urinated on himself.      Per friend,  pt had recently had cold and had gotten cold medicine from doctor. Pt had dry cough without other symptoms.       Per friend,  pt at baseline is unable to finish thoughts / speak full thoughts / sentences (at least for past 1/2 year), and has tremors (due to ?parkinsons?, unknown).     Currently, pt is unable to fully explain what is bothering him.  However states he has no cp / abd pain.  Does state he's having some R back / flank pain occasionally, but does not recall when it started.  Otherwise is not c/o anything else. He knows he was confused before and that's why he was brought in.  Upon further questioning, he seems frustrated he cannot remember his medications or his medical issues or his symptoms.     today he feels better. no pain, sob improved. no sob. medically stable for dc. awaiting bed in NH for STR.     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  Prostate cancer  Hypothyroidism, unspecified type  No significant past surgical history    Vital Signs Last 24 Hrs  T(C): 36.3 (28 Feb 2018 08:12), Max: 36.4 (27 Feb 2018 16:14)  T(F): 97.4 (28 Feb 2018 08:12), Max: 97.6 (27 Feb 2018 16:14)  HR: 74 (28 Feb 2018 08:12) (61 - 78)  BP: 135/65 (28 Feb 2018 08:12) (125/75 - 197/92)  BP(mean): --  RR: 18 (28 Feb 2018 08:12) (18 - 18)  SpO2: 97% (28 Feb 2018 08:12) (97% - 98%)    02-27    138  |  102  |  9<L>  ----------------------------<  98  3.6   |  27  |  0.7    Ca    9.3      27 Feb 2018 10:39              Culture - Urine (collected 26 Feb 2018 00:00)  Source: .Urine Clean Catch (Midstream)  Final Report (27 Feb 2018 11:45):    No growth        MEDICATIONS  (STANDING):  amLODIPine   Tablet 5 milliGRAM(s) Oral daily  aspirin enteric coated 81 milliGRAM(s) Oral daily  donepezil 10 milliGRAM(s) Oral at bedtime  enoxaparin Injectable 40 milliGRAM(s) SubCutaneous daily  hydrochlorothiazide 25 milliGRAM(s) Oral daily  levothyroxine 50 MICROGram(s) Oral daily  lisinopril 40 milliGRAM(s) Oral daily  metoprolol     tartrate 100 milliGRAM(s) Oral two times a day  oseltamivir 75 milliGRAM(s) Oral two times a day  pantoprazole    Tablet 40 milliGRAM(s) Oral before breakfast  simvastatin 40 milliGRAM(s) Oral at bedtime  tamsulosin 0.4 milliGRAM(s) Oral at bedtime

## 2018-02-28 NOTE — DISCHARGE NOTE ADULT - CARE PLAN
Principal Discharge DX:	Influenza  Goal:	resolution of symptoms with medications  Assessment and plan of treatment:	last dose of tamiflu today  Secondary Diagnosis:	Delirium  Goal:	was likely because of influenza ans is now resolved

## 2018-02-28 NOTE — PROGRESS NOTE ADULT - ASSESSMENT
1. AMS/delirium: resolved, likely from Influenza.   Patient has hx of Meningioma.   Head CT showed No acute intracranial hemorrhage or mass effect. Stable left frontal meningioma with surrounding vasogenic edema. Stable complete opacification of left maxillary sinus with hyperdense    2. Influenza A;   continue with droplet isolation  Continue Tamiflu 75mg BID started 2/24 , last dose tonight    3. Hematuria:   UA showed blood, negative for infection.  Possible traumatic due to , Dorantes placement in ED   Abdomen and Pelvis CT noted, no significant abnormalities.   Hx of prostate Ca. urology recommend out patient cystoscopy and urine cytology  Continue Flomax.     4. HTN:  uncontrolled  On Lisinopril 40mg, metoprolol 100mg BID, HCTZ 12.5mg, added amlodipine 5 mg q24 yesterday     5. Alzheimer's dementia: stable  patient is oriented times 3    6. Hypothyroidism:   continue Synthroid.     7. Hyperlipidemia:   on Simvastatin.     DVT PPX:  Venodyne boots, no heparin or LMWH due to hematuria    8- pt rehab- snf placement  waiting for bed

## 2018-02-28 NOTE — DISCHARGE NOTE ADULT - PLAN OF CARE
resolution of symptoms with medications last dose of tamiflu today was likely because of influenza ans is now resolved

## 2018-02-28 NOTE — DISCHARGE NOTE ADULT - HOSPITAL COURSE
82y m w pmh below brought in from home by friend for ams.   Per friend / proxy:  pt had been acting odd lately and had been more confused than usual.    In one instance, Pt told his friend that he was confused.  confused.  In another instance,  patient was found standing in [facility] hallway in the middle of the night.  when asked what he was doing,  the patient stated he was trying to find the bathroom and subsequently urinated on himself.  Per friend,  pt had recently had cold and had gotten cold medicine from doctor. Pt had dry cough without other symptoms. Per friend,  pt at baseline is unable to finish thoughts / speak full thoughts / sentences (at least for past 1/2 year), and has tremors (due to ?parkinsons?, unknown).     patient was diagnosed with influenza and was treated with tamiflu and his mental condition has improved since and is now back to baseline.     he was seen by PT rehab and was suggested to get physical therapy at nursing home ,     he is stable for discharge 82y m w pmh below brought in from home by friend for ams.   Per friend / proxy:  pt had been acting odd lately and had been more confused than usual.    In one instance, Pt told his friend that he was confused.  confused.  In another instance,  patient was found standing in [facility] hallway in the middle of the night.  when asked what he was doing,  the patient stated he was trying to find the bathroom and subsequently urinated on himself.  Per friend,  pt had recently had cold and had gotten cold medicine from doctor. Pt had dry cough without other symptoms. Per friend,  pt at baseline is unable to finish thoughts / speak full thoughts / sentences (at least for past 1/2 year), and has tremors (due to ?parkinsons?, unknown).     patient was diagnosed with influenza and was treated with tamiflu and his mental condition has improved since and is now back to baseline.   he was also hypertensive and his hydrochlorothiazide dose was increased from 12.5(home dose) to 25 mg once a day  he was taking ramipril at home and in the hospital was getting lisinopril 40 mg once a day(ramipril is not available in hospital), at discharge, resume ramipril or can continue lisinopril based on availability  he was seen by PT rehab and was suggested to get physical therapy at nursing home ,     he is stable for discharge

## 2018-02-28 NOTE — DISCHARGE NOTE ADULT - PATIENT PORTAL LINK FT
You can access the [facility] Patient Portal, offered by [facility], by registering with the following website: [email or web address]

## 2018-03-01 VITALS
DIASTOLIC BLOOD PRESSURE: 74 MMHG | OXYGEN SATURATION: 98 % | HEART RATE: 73 BPM | RESPIRATION RATE: 18 BRPM | SYSTOLIC BLOOD PRESSURE: 173 MMHG | TEMPERATURE: 98 F

## 2018-03-01 LAB
AMPHETAMINES BLD QL SCN: NEGATIVE — SIGNIFICANT CHANGE UP
ANION GAP SERPL CALC-SCNC: 9 MMOL/L — SIGNIFICANT CHANGE UP (ref 7–14)
BARBITURATES SERPLBLD QL: NEGATIVE — SIGNIFICANT CHANGE UP
BENZODIAZAPINES, SERUM: NEGATIVE — SIGNIFICANT CHANGE UP
BUN SERPL-MCNC: 22 MG/DL — HIGH (ref 10–20)
CALCIUM SERPL-MCNC: 9.7 MG/DL — SIGNIFICANT CHANGE UP (ref 8.5–10.1)
CANNABINOIDS SERPLBLD QL SCN: NEGATIVE — SIGNIFICANT CHANGE UP
CHLORIDE SERPL-SCNC: 100 MMOL/L — SIGNIFICANT CHANGE UP (ref 98–110)
CO2 SERPL-SCNC: 29 MMOL/L — SIGNIFICANT CHANGE UP (ref 17–32)
COCAINE+BZE SERPLBLD QL SCN: NEGATIVE — SIGNIFICANT CHANGE UP
CREAT SERPL-MCNC: 1 MG/DL — SIGNIFICANT CHANGE UP (ref 0.7–1.5)
CULTURE RESULTS: SIGNIFICANT CHANGE UP
CULTURE RESULTS: SIGNIFICANT CHANGE UP
GLUCOSE SERPL-MCNC: 94 MG/DL — SIGNIFICANT CHANGE UP (ref 70–110)
METHADONE SERPL-MCNC: NEGATIVE — SIGNIFICANT CHANGE UP
OPIATES SERPL QL: NEGATIVE — SIGNIFICANT CHANGE UP
PCP BLD QL SCN: NEGATIVE — SIGNIFICANT CHANGE UP
POTASSIUM SERPL-MCNC: 3.9 MMOL/L — SIGNIFICANT CHANGE UP (ref 3.5–5)
POTASSIUM SERPL-SCNC: 3.9 MMOL/L — SIGNIFICANT CHANGE UP (ref 3.5–5)
SODIUM SERPL-SCNC: 138 MMOL/L — SIGNIFICANT CHANGE UP (ref 135–146)
SPECIMEN SOURCE: SIGNIFICANT CHANGE UP
SPECIMEN SOURCE: SIGNIFICANT CHANGE UP

## 2018-03-01 RX ORDER — RAMIPRIL 5 MG
1 CAPSULE ORAL
Qty: 0 | Refills: 0 | COMMUNITY
Start: 2018-03-01

## 2018-03-01 RX ADMIN — ENOXAPARIN SODIUM 40 MILLIGRAM(S): 100 INJECTION SUBCUTANEOUS at 12:49

## 2018-03-01 RX ADMIN — PANTOPRAZOLE SODIUM 40 MILLIGRAM(S): 20 TABLET, DELAYED RELEASE ORAL at 14:38

## 2018-03-01 RX ADMIN — Medication 25 MILLIGRAM(S): at 06:00

## 2018-03-01 RX ADMIN — Medication 50 MICROGRAM(S): at 06:00

## 2018-03-01 RX ADMIN — Medication 75 MILLIGRAM(S): at 06:01

## 2018-03-01 RX ADMIN — LISINOPRIL 40 MILLIGRAM(S): 2.5 TABLET ORAL at 06:01

## 2018-03-01 RX ADMIN — Medication 81 MILLIGRAM(S): at 12:49

## 2018-03-01 RX ADMIN — Medication 100 MILLIGRAM(S): at 06:01

## 2018-03-01 NOTE — PROGRESS NOTE ADULT - SUBJECTIVE AND OBJECTIVE BOX
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  Prostate cancer  Hypothyroidism, unspecified type  No significant past surgical history      Vital Signs Last 24 Hrs  T(C): 36.6 (01 Mar 2018 08:25), Max: 36.8 (28 Feb 2018 23:29)  T(F): 97.9 (01 Mar 2018 08:25), Max: 98.3 (28 Feb 2018 23:29)  HR: 60 (01 Mar 2018 08:25) (60 - 72)  BP: 122/68 (01 Mar 2018 08:25) (122/68 - 148/70)  BP(mean): --  RR: 20 (01 Mar 2018 08:25) (18 - 20)  SpO2: 98% (01 Mar 2018 08:25) (98% - 99%)    PHYSICAL EXAM:      Constitutional: well developed and well nourished    Respiratory: lungs B/L clear on auscultation    Cardiovascular: S1S2 normal, no murmur heard    Gastrointestinal: Abd soft, non distended, non tender, BS+    Extremities: No pedal edema    Neurological: AAOX3, No FND          I&O's Summary    28 Feb 2018 07:01  -  01 Mar 2018 07:00  --------------------------------------------------------  IN: 0 mL / OUT: 500 mL / NET: -500 mL          02-27    138  |  102  |  9<L>  ----------------------------<  98  3.6   |  27  |  0.7    Ca    9.3      27 Feb 2018 10:39      MEDICATIONS  (STANDING):  aspirin enteric coated 81 milliGRAM(s) Oral daily  donepezil 10 milliGRAM(s) Oral at bedtime  enoxaparin Injectable 40 milliGRAM(s) SubCutaneous daily  hydrochlorothiazide 25 milliGRAM(s) Oral daily  levothyroxine 50 MICROGram(s) Oral daily  lisinopril 40 milliGRAM(s) Oral daily  metoprolol     tartrate 100 milliGRAM(s) Oral two times a day  oseltamivir 75 milliGRAM(s) Oral two times a day  pantoprazole    Tablet 40 milliGRAM(s) Oral before breakfast  simvastatin 40 milliGRAM(s) Oral at bedtime  tamsulosin 0.4 milliGRAM(s) Oral at bedtime    MEDICATIONS  (PRN):  acetaminophen   Tablet 650 milliGRAM(s) Oral every 6 hours PRN For Temp greater than 38.5 C (101.3 F)  promethazine Syrup 6.25 milliGRAM(s) Oral three times a day PRN nausea Patient is a 82y old  Male who presents with a chief complaint of AMS (28 Feb 2018 11:23)  was diagnosed with flu. finished tamiflu.     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  Prostate cancer  Hypothyroidism, unspecified type  No significant past surgical history      Vital Signs Last 24 Hrs  T(C): 36.6 (01 Mar 2018 08:25), Max: 36.8 (28 Feb 2018 23:29)  T(F): 97.9 (01 Mar 2018 08:25), Max: 98.3 (28 Feb 2018 23:29)  HR: 60 (01 Mar 2018 08:25) (60 - 72)  BP: 122/68 (01 Mar 2018 08:25) (122/68 - 148/70)  BP(mean): --  RR: 20 (01 Mar 2018 08:25) (18 - 20)  SpO2: 98% (01 Mar 2018 08:25) (98% - 99%)    PHYSICAL EXAM:      Constitutional: well developed and well nourished    Respiratory: lungs B/L clear on auscultation    Cardiovascular: S1S2 normal, no murmur heard    Gastrointestinal: Abd soft, non distended, non tender, BS+    Extremities: No pedal edema    Neurological: AAOX3, No FND          I&O's Summary    28 Feb 2018 07:01  -  01 Mar 2018 07:00  --------------------------------------------------------  IN: 0 mL / OUT: 500 mL / NET: -500 mL          02-27    138  |  102  |  9<L>  ----------------------------<  98  3.6   |  27  |  0.7    Ca    9.3      27 Feb 2018 10:39      MEDICATIONS  (STANDING):  aspirin enteric coated 81 milliGRAM(s) Oral daily  donepezil 10 milliGRAM(s) Oral at bedtime  enoxaparin Injectable 40 milliGRAM(s) SubCutaneous daily  hydrochlorothiazide 25 milliGRAM(s) Oral daily  levothyroxine 50 MICROGram(s) Oral daily  lisinopril 40 milliGRAM(s) Oral daily  metoprolol     tartrate 100 milliGRAM(s) Oral two times a day  oseltamivir 75 milliGRAM(s) Oral two times a day  pantoprazole    Tablet 40 milliGRAM(s) Oral before breakfast  simvastatin 40 milliGRAM(s) Oral at bedtime  tamsulosin 0.4 milliGRAM(s) Oral at bedtime    MEDICATIONS  (PRN):  acetaminophen   Tablet 650 milliGRAM(s) Oral every 6 hours PRN For Temp greater than 38.5 C (101.3 F)  promethazine Syrup 6.25 milliGRAM(s) Oral three times a day PRN nausea

## 2018-03-01 NOTE — PROGRESS NOTE ADULT - ATTENDING COMMENTS
pt seen and examined independently. Agree with resident note and physical exam with no exception.
Patient seen and examined independently. Agree with resident note with no exceptions. Case discussed with housestaff, nursing and patient.  awaiting bed in STR.
medically stable. awaiting bed for STR

## 2018-03-01 NOTE — PROGRESS NOTE ADULT - ASSESSMENT
1. AMS/delirium: resolved, likely from Influenza.   Patient has hx of Meningioma.   Head CT showed No acute intracranial hemorrhage or mass effect. Stable left frontal meningioma with surrounding vasogenic edema. Stable complete opacification of left maxillary sinus with hyperdense    2. Influenza A;   status post tamiflu , can discontinue droplet precautions    3. Hematuria:   UA showed blood, negative for infection.  Possible traumatic due to , [facility] placement in ED   Abdomen and Pelvis CT noted, no significant abnormalities.   Hx of prostate Ca. urology recommend out patient cystoscopy and urine cytology  Continue Flomax.     4. HTN:  uncontrolled  On Lisinopril 40mg, metoprolol 100mg BID, HCTZ 25 mg(it was not 12.5 as written in previous  patients BP was high few days ago, amlodipine was therefore added  but he was also on iv fluids at that point, i stopped the iv fluids 2 days ago and disonctinued amlodipine from today, so far BP has been in normal range, will continue to monitor, if he needs more meds, then will add amlodipine again      6. Hypothyroidism:   continue Synthroid.     7. Hyperlipidemia:   on Simvastatin.     DVT PPX:  Venodyne boots, no heparin or LMWH due to hematuria    8- pt rehab- snf placement  waiting for bed 1. AMS/delirium: resolved, likely from Influenza.   Patient has hx of Meningioma.   Head CT showed No acute intracranial hemorrhage or mass effect. Stable left frontal meningioma with surrounding vasogenic edema. Stable complete opacification of left maxillary sinus with hyperdense    2. Influenza A;   status post tamiflu , can discontinue droplet precautions    3. Hematuria:   UA showed blood, negative for infection.  Possible traumatic due to , [facility] placement in ED   Abdomen and Pelvis CT noted, no significant abnormalities.   Hx of prostate Ca received seed implant and radiotherapy 2 years ago  . urology recommend out patient cystoscopy and urine cytology  Continue Flomax.     4. HTN:  uncontrolled  On Lisinopril 40mg, metoprolol 100mg BID, HCTZ 25 mg(it was not 12.5 as written in previous  patients BP was high few days ago, amlodipine was therefore added  but he was also on iv fluids at that point, i stopped the iv fluids 2 days ago and disonctinued amlodipine from today, so far BP has been in normal range, will continue to monitor, if he needs more meds, then will add amlodipine again      6. Hypothyroidism:   continue Synthroid.     7. Hyperlipidemia:   on Simvastatin.     DVT PPX:  Venodyne boots, no heparin or LMWH due to hematuria    8- pt rehab- snf placement- currently there is no plan for chemoherapy for prostrate cancer or meningioma  waiting for bed 1. AMS/delirium: resolved, likely from Influenza.   Patient has hx of Meningioma.   Head CT showed No acute intracranial hemorrhage or mass effect. Stable left frontal meningioma with surrounding vasogenic edema. Stable complete opacification of left maxillary sinus with hyperdense    2. Influenza A;   status post tamiflu , can discontinue droplet precautions    3. Hematuria: resolved  UA showed blood, negative for infection.  Possible traumatic due to , [facility] placement in ED   Abdomen and Pelvis CT noted, no significant abnormalities.   Hx of prostate Ca received seed implant and radiotherapy 2 years ago  . urology recommend out patient cystoscopy and urine cytology  Continue Flomax.     4. HTN:  uncontrolled  On Lisinopril 40mg, metoprolol 100mg BID, HCTZ 25 mg(it was not 12.5 as written in previous  patients BP was high few days ago, amlodipine was therefore added  but he was also on iv fluids at that point, i stopped the iv fluids 2 days ago and disonctinued amlodipine from today, so far BP has been in normal range, will continue to monitor, if he needs more meds, then will add amlodipine again      6. Hypothyroidism:   continue Synthroid.     7. Hyperlipidemia:   on Simvastatin.     DVT PPX:  Venodyne boots, no heparin or LMWH due to hematuria    8- pt rehab- snf placement- currently there is no plan for chemoherapy for prostrate cancer or meningioma  waiting for bed

## 2018-03-01 NOTE — PROGRESS NOTE ADULT - PROVIDER SPECIALTY LIST ADULT
Hospitalist
Internal Medicine
Urology
Internal Medicine

## 2018-03-03 LAB
CULTURE RESULTS: SIGNIFICANT CHANGE UP
SPECIMEN SOURCE: SIGNIFICANT CHANGE UP

## 2018-03-05 DIAGNOSIS — F02.80 DEMENTIA IN OTHER DISEASES CLASSIFIED ELSEWHERE, UNSPECIFIED SEVERITY, WITHOUT BEHAVIORAL DISTURBANCE, PSYCHOTIC DISTURBANCE, MOOD DISTURBANCE, AND ANXIETY: ICD-10-CM

## 2018-03-05 DIAGNOSIS — J10.1 INFLUENZA DUE TO OTHER IDENTIFIED INFLUENZA VIRUS WITH OTHER RESPIRATORY MANIFESTATIONS: ICD-10-CM

## 2018-03-05 DIAGNOSIS — I10 ESSENTIAL (PRIMARY) HYPERTENSION: ICD-10-CM

## 2018-03-05 DIAGNOSIS — R31.9 HEMATURIA, UNSPECIFIED: ICD-10-CM

## 2018-03-05 DIAGNOSIS — G30.9 ALZHEIMER'S DISEASE, UNSPECIFIED: ICD-10-CM

## 2018-03-05 DIAGNOSIS — Z85.46 PERSONAL HISTORY OF MALIGNANT NEOPLASM OF PROSTATE: ICD-10-CM

## 2018-03-05 DIAGNOSIS — E03.9 HYPOTHYROIDISM, UNSPECIFIED: ICD-10-CM

## 2018-03-05 DIAGNOSIS — E78.5 HYPERLIPIDEMIA, UNSPECIFIED: ICD-10-CM

## 2018-03-05 DIAGNOSIS — R41.82 ALTERED MENTAL STATUS, UNSPECIFIED: ICD-10-CM

## 2018-03-05 DIAGNOSIS — R41.0 DISORIENTATION, UNSPECIFIED: ICD-10-CM

## 2018-03-05 DIAGNOSIS — E87.6 HYPOKALEMIA: ICD-10-CM

## 2018-03-05 DIAGNOSIS — D32.9 BENIGN NEOPLASM OF MENINGES, UNSPECIFIED: ICD-10-CM

## 2018-03-06 DIAGNOSIS — F02.81 DEMENTIA IN OTHER DISEASES CLASSIFIED ELSEWHERE, UNSPECIFIED SEVERITY, WITH BEHAVIORAL DISTURBANCE: ICD-10-CM

## 2018-03-13 ENCOUNTER — OUTPATIENT (OUTPATIENT)
Dept: OUTPATIENT SERVICES | Facility: HOSPITAL | Age: 83
LOS: 1 days | Discharge: HOME | End: 2018-03-13

## 2018-03-13 DIAGNOSIS — I10 ESSENTIAL (PRIMARY) HYPERTENSION: ICD-10-CM

## 2018-03-13 PROBLEM — G30.8 OTHER ALZHEIMER'S DISEASE: Chronic | Status: ACTIVE | Noted: 2018-02-23

## 2018-03-13 PROBLEM — E78.00 PURE HYPERCHOLESTEROLEMIA, UNSPECIFIED: Chronic | Status: ACTIVE | Noted: 2018-02-23

## 2018-03-13 PROBLEM — C61 MALIGNANT NEOPLASM OF PROSTATE: Chronic | Status: ACTIVE | Noted: 2018-02-23

## 2018-03-13 PROBLEM — E03.9 HYPOTHYROIDISM, UNSPECIFIED: Chronic | Status: ACTIVE | Noted: 2018-02-23

## 2018-11-09 ENCOUNTER — OUTPATIENT (OUTPATIENT)
Dept: OUTPATIENT SERVICES | Facility: HOSPITAL | Age: 83
LOS: 1 days | Discharge: HOME | End: 2018-11-09

## 2018-11-09 DIAGNOSIS — K02.63 DENTAL CARIES ON SMOOTH SURFACE PENETRATING INTO PULP: ICD-10-CM

## 2018-11-09 PROBLEM — D32.9 BENIGN NEOPLASM OF MENINGES, UNSPECIFIED: Chronic | Status: ACTIVE | Noted: 2018-02-24

## 2018-11-16 ENCOUNTER — OUTPATIENT (OUTPATIENT)
Dept: OUTPATIENT SERVICES | Facility: HOSPITAL | Age: 83
LOS: 1 days | Discharge: HOME | End: 2018-11-16

## 2018-11-16 DIAGNOSIS — M27.40 UNSPECIFIED CYST OF JAW: ICD-10-CM

## 2018-11-28 ENCOUNTER — OUTPATIENT (OUTPATIENT)
Dept: OUTPATIENT SERVICES | Facility: HOSPITAL | Age: 83
LOS: 1 days | Discharge: HOME | End: 2018-11-28

## 2018-12-03 DIAGNOSIS — K02.63 DENTAL CARIES ON SMOOTH SURFACE PENETRATING INTO PULP: ICD-10-CM

## 2019-03-09 ENCOUNTER — OUTPATIENT (OUTPATIENT)
Dept: OUTPATIENT SERVICES | Facility: HOSPITAL | Age: 84
LOS: 1 days | Discharge: HOME | End: 2019-03-09

## 2019-03-09 DIAGNOSIS — I10 ESSENTIAL (PRIMARY) HYPERTENSION: ICD-10-CM

## 2019-03-11 ENCOUNTER — OUTPATIENT (OUTPATIENT)
Dept: OUTPATIENT SERVICES | Facility: HOSPITAL | Age: 84
LOS: 1 days | Discharge: HOME | End: 2019-03-11

## 2019-03-11 DIAGNOSIS — I10 ESSENTIAL (PRIMARY) HYPERTENSION: ICD-10-CM

## 2019-03-25 ENCOUNTER — OUTPATIENT (OUTPATIENT)
Dept: OUTPATIENT SERVICES | Facility: HOSPITAL | Age: 84
LOS: 1 days | Discharge: HOME | End: 2019-03-25

## 2019-03-25 DIAGNOSIS — G20 PARKINSON'S DISEASE: ICD-10-CM

## 2019-05-16 PROBLEM — Z00.00 ENCOUNTER FOR PREVENTIVE HEALTH EXAMINATION: Status: ACTIVE | Noted: 2019-05-16

## 2019-06-12 ENCOUNTER — APPOINTMENT (OUTPATIENT)
Dept: NEUROLOGY | Facility: CLINIC | Age: 84
End: 2019-06-12

## 2019-09-19 ENCOUNTER — OUTPATIENT (OUTPATIENT)
Dept: OUTPATIENT SERVICES | Facility: HOSPITAL | Age: 84
LOS: 1 days | Discharge: HOME | End: 2019-09-19

## 2019-09-19 DIAGNOSIS — R79.9 ABNORMAL FINDING OF BLOOD CHEMISTRY, UNSPECIFIED: ICD-10-CM

## 2021-02-22 NOTE — ED ADULT NURSE NOTE - NS ED NURSE DISCH DISPOSITION
Admitted [Follow-Up] : a follow-up visit [Abnormal CT Scan] : abnormal CT Scan [COPD] : COPD [Lung Cancer] : lung cancer [Shortness of Breath] : shortness of Breath [FreeTextEntry2] : lung nodules Discharged

## 2022-04-21 NOTE — DISCHARGE NOTE ADULT - BECAUSE OF A PHYSICAL, MENTAL OR EMOTIONAL CONDITION, DO YOU HAVE DIFFICULTY DOING  ERRANDS ALONE LIKE VISITING A DOCTOR'S OFFICE OR SHOPPING (15 YEARS AND OLDER)
No Methotrexate Pregnancy And Lactation Text: This medication is Pregnancy Category X and is known to cause fetal harm. This medication is excreted in breast milk.